# Patient Record
Sex: MALE | Race: WHITE | NOT HISPANIC OR LATINO | Employment: OTHER | URBAN - METROPOLITAN AREA
[De-identification: names, ages, dates, MRNs, and addresses within clinical notes are randomized per-mention and may not be internally consistent; named-entity substitution may affect disease eponyms.]

---

## 2017-01-02 DIAGNOSIS — C68.9 TRANSITIONAL CELL CARCINOMA: ICD-10-CM

## 2017-01-03 ENCOUNTER — OFFICE VISIT (OUTPATIENT)
Dept: HEMATOLOGY/ONCOLOGY | Facility: CLINIC | Age: 82
End: 2017-01-03
Payer: MEDICARE

## 2017-01-03 ENCOUNTER — INFUSION (OUTPATIENT)
Dept: INFUSION THERAPY | Facility: HOSPITAL | Age: 82
End: 2017-01-03
Attending: INTERNAL MEDICINE
Payer: MEDICARE

## 2017-01-03 VITALS
DIASTOLIC BLOOD PRESSURE: 60 MMHG | BODY MASS INDEX: 18.36 KG/M2 | TEMPERATURE: 98 F | BODY MASS INDEX: 18.36 KG/M2 | WEIGHT: 135.56 LBS | SYSTOLIC BLOOD PRESSURE: 100 MMHG | OXYGEN SATURATION: 100 % | HEART RATE: 78 BPM | WEIGHT: 135.56 LBS | HEIGHT: 72 IN | HEIGHT: 72 IN | RESPIRATION RATE: 18 BRPM

## 2017-01-03 DIAGNOSIS — N18.30 CHRONIC KIDNEY DISEASE, STAGE III (MODERATE): Primary | ICD-10-CM

## 2017-01-03 DIAGNOSIS — C64.2 TRANSITIONAL CELL CARCINOMA OF LEFT KIDNEY: ICD-10-CM

## 2017-01-03 DIAGNOSIS — C77.8 MALIGNANT NEOPLASM METASTATIC TO LYMPH NODES OF MULTIPLE SITES: ICD-10-CM

## 2017-01-03 DIAGNOSIS — C64.2 TRANSITIONAL CELL CARCINOMA OF LEFT KIDNEY: Primary | ICD-10-CM

## 2017-01-03 DIAGNOSIS — J84.10 PULMONARY FIBROSIS: ICD-10-CM

## 2017-01-03 DIAGNOSIS — Z51.11 CHEMOTHERAPY MANAGEMENT, ENCOUNTER FOR: ICD-10-CM

## 2017-01-03 DIAGNOSIS — N18.30 CHRONIC KIDNEY DISEASE, STAGE III (MODERATE): ICD-10-CM

## 2017-01-03 DIAGNOSIS — K21.9 GASTROESOPHAGEAL REFLUX DISEASE, ESOPHAGITIS PRESENCE NOT SPECIFIED: ICD-10-CM

## 2017-01-03 DIAGNOSIS — R05.9 COUGH: ICD-10-CM

## 2017-01-03 PROCEDURE — 1160F RVW MEDS BY RX/DR IN RCRD: CPT | Mod: S$GLB,,, | Performed by: INTERNAL MEDICINE

## 2017-01-03 PROCEDURE — 99499 UNLISTED E&M SERVICE: CPT | Mod: S$GLB,,, | Performed by: INTERNAL MEDICINE

## 2017-01-03 PROCEDURE — 1126F AMNT PAIN NOTED NONE PRSNT: CPT | Mod: S$GLB,,, | Performed by: INTERNAL MEDICINE

## 2017-01-03 PROCEDURE — 1159F MED LIST DOCD IN RCRD: CPT | Mod: S$GLB,,, | Performed by: INTERNAL MEDICINE

## 2017-01-03 PROCEDURE — 99999 PR PBB SHADOW E&M-EST. PATIENT-LVL IV: CPT | Mod: PBBFAC,,, | Performed by: INTERNAL MEDICINE

## 2017-01-03 PROCEDURE — 1157F ADVNC CARE PLAN IN RCRD: CPT | Mod: S$GLB,,, | Performed by: INTERNAL MEDICINE

## 2017-01-03 PROCEDURE — 99215 OFFICE O/P EST HI 40 MIN: CPT | Mod: 25,S$GLB,, | Performed by: INTERNAL MEDICINE

## 2017-01-03 PROCEDURE — 96413 CHEMO IV INFUSION 1 HR: CPT | Mod: PO

## 2017-01-03 PROCEDURE — 63600175 PHARM REV CODE 636 W HCPCS: Mod: PO | Performed by: INTERNAL MEDICINE

## 2017-01-03 PROCEDURE — 25000003 PHARM REV CODE 250: Mod: PO | Performed by: INTERNAL MEDICINE

## 2017-01-03 PROCEDURE — C9483 INJECTION, ATEZOLIZUMAB: HCPCS | Mod: PO | Performed by: INTERNAL MEDICINE

## 2017-01-03 RX ORDER — GUAIFENESIN 600 MG/1
600 TABLET, EXTENDED RELEASE ORAL 2 TIMES DAILY
Qty: 30 TABLET | Refills: 0 | Status: SHIPPED | OUTPATIENT
Start: 2017-01-03 | End: 2018-01-03

## 2017-01-03 RX ORDER — HEPARIN 100 UNIT/ML
500 SYRINGE INTRAVENOUS
Status: COMPLETED | OUTPATIENT
Start: 2017-01-03 | End: 2017-01-03

## 2017-01-03 RX ORDER — PREDNISONE 10 MG/1
TABLET ORAL
Qty: 30 TABLET | Refills: 0 | Status: SHIPPED | OUTPATIENT
Start: 2017-01-03 | End: 2017-01-24

## 2017-01-03 RX ORDER — SODIUM CHLORIDE 0.9 % (FLUSH) 0.9 %
10 SYRINGE (ML) INJECTION
Status: CANCELLED
Start: 2017-01-03

## 2017-01-03 RX ORDER — ESOMEPRAZOLE MAGNESIUM 40 MG/1
CAPSULE, DELAYED RELEASE ORAL
Qty: 30 CAPSULE | Refills: 0 | Status: SHIPPED | OUTPATIENT
Start: 2017-01-03 | End: 2017-01-31 | Stop reason: SDUPTHER

## 2017-01-03 RX ORDER — HEPARIN 100 UNIT/ML
500 SYRINGE INTRAVENOUS
Status: CANCELLED
Start: 2017-01-03 | End: 2017-01-03

## 2017-01-03 RX ADMIN — HEPARIN 500 UNITS: 100 SYRINGE at 02:01

## 2017-01-03 RX ADMIN — ATEZOLIZUMAB 1200 MG: 1200 INJECTION, SOLUTION INTRAVENOUS at 02:01

## 2017-01-03 RX ADMIN — SODIUM CHLORIDE: 9 INJECTION, SOLUTION INTRAVENOUS at 02:01

## 2017-01-03 NOTE — PLAN OF CARE
Problem: Patient Care Overview (Adult)  Goal: Plan of Care Review  Outcome: Ongoing (interventions implemented as appropriate)  Pt is without complaint today

## 2017-01-03 NOTE — PROGRESS NOTES
Hematology/Oncology Established Visit    Reason for Consult: Management of renal mass    Consult requested by: Dr. 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  Note: Dr. Hollie Acevedo in Pathology stated that urothelial type favored rather than RCC given positivity for MURRAY-3 and p63 and negativity for PAX-8.    Prior Treatment: Weekly Carboplatin-Gemcitabine, starting 3/17/16 (given on days 1, 8, 15 of 28-day cycle). Discontinued 9/7/16 due to disease progression.    Current Treatment: Atezolizumab 1200mg IV every 3 weeks    History of Present Illness:  Mr. Lake is an 89 y/o male with HTN, CAD, h/o MALT lymphoma s/p radiation who comes in for evaluation of metastatic renal cell carcinoma. He was initially evaluated by Dr. Bonds in [facility] at the [facility]. Given drenching night sweats and weight loss, patient underwent PET scan, which was notable for a large hypermetabolic left renal mass as well as hypermetabolic retroperitoneal LNs and adrenal gland on right. Potential treatment with nephrectomy followed by systemic therapy was discussed with the patient as well as hospice. Patient opted to think about this for a while and obtain a 2nd opinion. He saw. Dr. Aiden Hendrickson in [place] for the 2nd opinion. He underwent a  CT-guided biopsy of the kidney mass, was told it was positive for renal cell carcinoma and not transitional cell carcinoma. He was told he could start on potential oral agents for treatment. The patient and his wife were in the process of moving into an assisted living facility. Then they both were admitted for pneumonia. The patient was discharged from the hospital in early February, has been admitted to [facility] living [facility], and now comes in to discuss potentially starting some sort of treatment. Prior to the recent PNA, patient states he was active  as the sole caretaker of his wife with Alzheimer's dementia. He did all the grocery shopping and cooking. He was up on his feet more than 50% of the day. He has slowed down after the recent PNA. However, he has a good appetite and states he is gaining strength. He does endorse fatigue, BROWNING, but no CP. Outside records reviewed: Urine cytology negative. Cystoscopy with biopsy negative per patient. CT-guided biopsy consistent with transitional cell carcinoma.    Patient comes in for follow up and for continued treatment on Atezolizumab (Tecentriq). He does have a mildly productive cough. No recent fevers, chills, sweats. His appetite has improved and he has gained some weight.  His chronic medical problems include BPH which is controlled on Flomax and Pulmonary fibrosis which is stable with use of Albuterol inhaler prn. He is taking stool softeners as needed for constipation. His chronic medical problems include HTN, CAD, managed by primary care.    ROS:  General:  No wt loss, fever/chills, night sweats +fatigue  Eyes: No vision problems, pain or inflammation.   Ears/Nose: No difficultly hearing, ear pain, rhinorrhea, or epistaxis  Oropharynx: No ulcers, dysphagia, or odynophagia  Cardiovascular: No chest pains, sob, PND. +dyspnea on exertion  Pulmonary: No cough, sob, hemoptysis  Gastrointestional: No n/v/d, melena, hematochezia, or change in bowel habits +poor appetite  : No dysuria, hematuria, pelvic pain or flank pain  Musculoskeletal: No myalgias, weakness, or arthralgias  Neurological: No headaches, focal deficits, or seizure activity  Endocrine: No heat or cold intolerance   Skin: No rashes or lesions +pruritus  Psychiatric: No symptoms of mood disorders  Heme/Lymph: No lymph node enlargment    Past Medical History   Diagnosis Date    Anemia     AR (allergic rhinitis)     Arthritis     Atrial fibrillation      in 2010 after pneumonia    BPH (benign prostatic hyperplasia)     BPH with urinary obstruction      CAD (coronary artery disease)     Cancer      MALT lymphoma//radiation    Cancer of lymphatic and hematopoietic tissue 2/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 Former smoker, quit in 1970 after 30 pk-yrs. 10 glasses of wine/week or 1-2 glasses per day. Retired from prior work in engineering. Lives in an assisted living facility now.    Family History: family history includes Cancer in his sister; Heart disease in his brother and mother; Stroke in his brother. There is no history of Amblyopia, Blindness, Diabetes, Glaucoma, Macular degeneration, Retinal detachment, Strabismus, Thyroid disease, Hypertension, or Cataracts. Sister had DLBCL.    Physical Exam:  There were no vitals filed for this visit.  There is no height or weight on file to calculate BMI.   ECOG PS: 2  General:  AAOx4, pleasant thin elderly male in no acute distress, accompanied by his niece.   HEENT: EOMI. Normocephalic and atraumatic. No maxillary sinus tenderness. External auditory canals clear and TMs intact without lesions. Nasal and oral mucosal membranes moist. Normal dentition and gums.   Neck: no LAD, thyromegaly, normal ROM  Pulmonary: Fine crackles in bilateral lower lung fields, otherwise clear to auscultation, Normal  effort with no accessory muscle use, no wheezes/rales/rhonchi  CV: Normal rate, regular rhythm, no murmurs/rubs/gallops, no edema  ABD:  Soft, nontender, nondistended, no mass, and without hepatosplenomegaly   Ext: No clubbing, cyanosis, normal ROM. 2+ pitting edema in BLE  Skin: No rashes, lesions, bruising or petechiae  Neurological: No focal deficits, CN II to XII grossly intact, normal coordination    Psychiatric:  Normal mood, affect and judgement  Hem/Lymph:  No submandibular, cervical, supraclavicular, axillary LAD.    Labs:    Lab Results   Component Value Date    WBC 7.60 01/03/2017    HGB 10.6 (L) 01/03/2017    HCT 34.5 (L) 01/03/2017    MCV 92 01/03/2017     01/03/2017     Lab Results   Component Value Date     01/03/2017    K 4.9 01/03/2017     01/03/2017    CO2 23 01/03/2017    BUN 36 (H) 01/03/2017    CREATININE 1.2 01/03/2017    CALCIUM 9.5 01/03/2017    ANIONGAP 11 01/03/2017    ESTGFRAFRICA >60 01/03/2017    EGFRNONAA 53 (A) 01/03/2017     Lab Results   Component Value Date    ALT 13 01/03/2017    AST 15 01/03/2017    ALKPHOS 59 01/03/2017    BILITOT 0.2 01/03/2017       Lab Results   Component Value Date    IRON 16 (L) 02/25/2016    TIBC 271 02/25/2016    FERRITIN 1435 (H) 04/21/2016     Lab Results   Component Value Date    ZCYBTAUN27 594 07/14/2016     Lab Results   Component Value Date    FOLATE 15.6 07/14/2016     Lab Results   Component Value Date    TSH 0.431 12/13/2016       Imaging:  PET/CT 10/6/2015:   Large hypermetabolic left renal mass highly suspicious for renal cell carcinoma. There also findings suggestive of metastatic disease to mediastinal lymph nodes, a right para-aortic retroperitoneal lymph node, and the right adrenal gland.    CXR 2/16/16:  Interval improved inspiratory result. Overall appearance appears to have returned to baseline. Chronic interstitial disease with focal areas of fibrosis again noted, more pronounced on the RIGHT. No dense consolidation or  definite effusion. Osteopenia and spondylosis. Narrowing at the cardia mediastinal contour and normal appearance of the hilar soft tissues. Trachea is midline.     Chest CT and urogram 3/9/16:  1. Diffusely abnormal appearance of the left kidney which measures approximately 13.6 x 9 cm in diameter and appears nonfunctioning. Most of the kidney appears to be occupied by a neoplastic process.  2. Complex cyst upper pole right kidney containing mural calcification.  3. Arteriosclerotic disease.  4. Old granulomatous disease.  5. Interstitial disease in both lungs with calcified pleural plaques favoring asbestos exposure.  6. Groundglass nodules in the right lung measuring up to 8.2 mm. Per Fleischer Society guidelines for nodule >8mm; in a low or high risk patient, recommend 3, 9, and 24 month CT chest follow-up to exclude neoplasia. PET scan and/or biopsy may also be considered. Metastatic disease not excluded.  7. Retroperitoneal adenopathy.   8. Enlargement of the right adrenal gland. Metastatic disease not excluded.    Chest/abd/pelvis CT 5/5/16:  1.  Left renal mass appears overall stable in size.  2.  Stable appearance to mildly enlarged right adrenal gland.  3.  Interval decrease in size of index right periaortic node.  4.  Other chronic findings as discussed above appear overall stable.    Chest/abd/pelvis CT 97/16:  Detrimental interval change with regard to enlarging right hilar and retroperitoneal lymphadenopathy.  Little interval change in large left renal mass.    Chest/abd/pelvis CT 11/9/16:  1.  Slight interval worsening with enlarging confluent right hilar adenopathy and minimal increase in size of primary left renal lesion.  2.  Unchanged retroperitoneal adenopathy..  3.  Other stable chronic findings as above.    Assessment / Plan:  Leeroy Lake is a 89 y.o. male with HTN, CAD, prior h/o MALT lymphoma comes in for evaluation of metastatic renal cell carcinoma.    1. Metastatic transitional cell  carcinoma: Although history provided by patient was suggestive of metastatic renal cell carcinoma, review of pathology report and discussion with pathologist who read the biopsy is more consistent with transitional cell (urothelial carcinoma), likely arising from the left renal pelvis. Given metastatic lympadenopathy on PET, specifically the left periaortic LN of 2.5 x 2.2cm with SUV 16.9, feel this is clearly metastatic disease. There is no indication for nephrectomy in the setting of metastatic transitional cell carcinoma. Pt was treated with weekly Carboplatin + Gemcitabine. Then, due to disease progression, he was switched to Atezolizumab.   -- Discontinued Carbo[place] given disease progression.   -- Proceed with 2nd line Atezolizumab - cycle 6, day 1 today. This will be given 1200mg IV every 3 weeks.  -- Chest/abd/pelvis CT in 2 weeks.   -- Return in 3 weeks for cycle 7    2. H/o hyperkalemia: Better controlled now that pt is limiting juices and following a low potassium diet.    3.  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 Fluctuating and likely due to anemia of chronic disease, cancer and chemotherapy. S/p Injectafer x 2. VitB12, folate normal.  -- No Procrit needed today given Hgb 10.6    4. Pulmonary fibrosis: Stable. Seen on recent CXR. Unclear etiology and may be related to prior work exposure or may be idiopathic. Uses Albuterol inhaler prn.    5. Weight loss/low appetite: Improved with continued treatment and Prednisone.   -- Change Prednisone to every other day 10mg    6. Pruritus: 13% incidence of this seen with Atezolizumab. Improved with Benadryl ointment.     Ankita Edge M.D.  Hematology Oncology

## 2017-01-03 NOTE — MR AVS SNAPSHOT
Patient Information     Patient Name Sex Leeroy Lopez Male 4/10/1927      Visit Information        Provider Department Dept Phone Center    1/3/2017 1:30 PM [facility] Chemo Infusion [facility] Chemotherapy Infusion [phone] [facility]      Patient Instructions     None      Your Current Medications Are     acetaminophen (TYLENOL) 650 MG TbSR    carvedilol (COREG) 3.125 MG tablet    dutasteride (AVODART) 0.5 mg capsule    esomeprazole (NEXIUM) 40 MG capsule    Lactobacillus rhamnosus GG (CULTURELLE) 10 billion cell capsule    predniSONE (DELTASONE) 10 MG tablet    senna (SENOKOT) 8.6 mg tablet    tamsulosin (FLOMAX) 0.4 mg Cp24    guaifenesin (MUCINEX) 600 mg 12 hr tablet    nitroGLYCERIN (NITROSTAT) 0.3 MG SL tablet    ondansetron (ZOFRAN) 8 MG tablet    diphenhydrAMINE-zinc acetate 1-0.1% (BENADRYL ITCH STOPPING) cream (Discontinued)      Facility-Administered Medications     atezolizumab (TECENTRIQ) 1,200 mg in sodium chloride 0.9% 270 mL chemo infusion    heparin, porcine (PF) 100 unit/mL injection flush 500 Units    sodium chloride 0.9% 100 mL flush bag      Appointments for Next Year     2017 10:30 AM HEARING AID FOLLOW UP (30 min.) [facility] - Hearing Aids HEARING AIDSIFEANYI    Arrive at check-in approximately 15 minutes before your scheduled appointment time. Bring all outside medical records and imaging, along with a list of your current medications and insurance card.    (off [employer]) 4th floor    2017  1:15 PM NON FASTING LAB (5 min.) [facility] LABORATORYGIOVANI    Arrive at check-in approximately 15 minutes before your scheduled appointment time. Bring all outside medical records and imaging, along with a list of your current medications and insurance card.    (off [employer]) 2nd floor    2017  1:40 PM ESTABLISHED PATIENT (20 min.) [facility] - Hemotology Oncology Ankita Edge MD    Arrive at check-in approximately 15 minutes before your scheduled  appointment time. Bring all outside medical records and imaging, along with a list of your current medications and insurance card.    (off [address]) 3rd Floor    1/24/2017  2:15 PM INFUSION 060 MIN (60 min.) [facility] CHAIR [facility]    Arrive at check-in approximately 15 minutes before your scheduled appointment time. Bring all outside medical records and imaging, along with a list of your current medications and insurance card.    2/2/2017 10:25 AM NON FASTING LAB (5 min.) [facility] LABORATORY [facility]    Arrive at check-in approximately 15 minutes before your scheduled appointment time. Bring all outside medical records and imaging, along with a list of your current medications and insurance card.    (off [address]) 2nd floor    2/2/2017 11:20 AM ESTABLISHED PATIENT (20 min.) [facility] - Nephrology Rafael Sultana MD    Arrive at check-in approximately 15 minutes before your scheduled appointment time. Bring all outside medical records and imaging, along with a list of your current medications and insurance card.    (off [address]) 3t floor    2/14/2017 11:40 AM ESTABLISHED PATIENT (20 min.) [facility] - Cardiology Donnie Raygoza MD    Arrive at check-in approximately 15 minutes before your scheduled appointment time. Bring all outside medical records and imaging, along with a list of your current medications and insurance card.    (off [address]) 3rd floor    4/24/2017 10:20 AM ESTABLISHED PATIENT (20 min.) [facility] Pulmonary Services Ace Kelly MD    Arrive at check-in approximately 15 minutes before your scheduled appointment time. Bring all outside medical records and imaging, along with a list of your current medications and insurance card.    (off [facility]) 2nd floor         Default Flowsheet Data (last 24 hours)      Amb Complex Vitals Ralph        01/03/17 1353 01/03/17 1318             Measurements    Weight 61.5 kg (135 lb 9.3 oz) 61.5 kg (135 lb 9.3  oz)       Height 6' (1.829 m) 6' (1.829 m)       BSA (Calculated - sq m) 1.77 sq meters 1.77 sq meters       BMI (Calculated) 18.4 18.4       BP  100/60       Temp  97.5 °F (36.4 °C)       Pulse  78       Resp  18       SpO2  100 %       Pain Assessment    Pain Score Zero Zero               Allergies     Codeine Swelling    Novocain  [Procaine]     Other reaction(s): Dizziness      Medications You Received from 01/02/2017 1445 to 01/03/2017 1445        Date/Time Order Dose Route Action     01/03/2017 1408 atezolizumab (TECENTRIQ) 1,200 mg in sodium chloride 0.9% 270 mL chemo infusion 1,200 mg Intravenous New Bag     01/03/2017 1405 sodium chloride 0.9% 100 mL flush bag   Intravenous New Bag      Current Discharge Medication List     Cannot display discharge medications since this is not an admission.

## 2017-01-05 ENCOUNTER — TELEPHONE (OUTPATIENT)
Dept: CARDIOLOGY | Facility: CLINIC | Age: 82
End: 2017-01-05

## 2017-01-05 DIAGNOSIS — E78.2 MIXED HYPERLIPIDEMIA: Primary | ICD-10-CM

## 2017-01-05 RX ORDER — ATORVASTATIN CALCIUM 20 MG/1
20 TABLET, FILM COATED ORAL DAILY
Qty: 90 TABLET | Refills: 3 | Status: SHIPPED | OUTPATIENT
Start: 2017-01-05 | End: 2018-01-05

## 2017-01-19 ENCOUNTER — HOSPITAL ENCOUNTER (OUTPATIENT)
Dept: RADIOLOGY | Facility: HOSPITAL | Age: 82
Discharge: HOME | End: 2017-01-19
Attending: INTERNAL MEDICINE
Payer: MEDICARE

## 2017-01-19 ENCOUNTER — HOSPITAL ENCOUNTER (OUTPATIENT)
Dept: RADIOLOGY | Facility: HOSPITAL | Age: 82
Discharge: HOME | End: 2017-01-19
Attending: FAMILY MEDICINE
Payer: MEDICARE

## 2017-01-19 ENCOUNTER — OFFICE VISIT (OUTPATIENT)
Dept: URGENT CARE | Facility: CLINIC | Age: 82
End: 2017-01-19
Payer: MEDICARE

## 2017-01-19 VITALS — DIASTOLIC BLOOD PRESSURE: 68 MMHG | SYSTOLIC BLOOD PRESSURE: 130 MMHG | OXYGEN SATURATION: 97 % | TEMPERATURE: 98 F
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
## 2017-01-19 DIAGNOSIS — R42 DIZZINESS: ICD-10-CM

## 2017-01-19 DIAGNOSIS — C64.2 TRANSITIONAL CELL CARCINOMA OF LEFT KIDNEY: ICD-10-CM

## 2017-01-19 DIAGNOSIS — C64.2 METASTATIC RENAL CELL CARCINOMA, LEFT: ICD-10-CM

## 2017-01-19 DIAGNOSIS — I95.1 ORTHOSTATIC HYPOTENSION: ICD-10-CM

## 2017-01-19 PROBLEM — C64.9 METASTATIC RENAL CELL CARCINOMA: Status: ACTIVE | Noted: 2017-01-19

## 2017-01-19 LAB — GLUCOSE SERPL-MCNC: 143 MG/DL (ref 70–110)

## 2017-01-19 PROCEDURE — 71260 CT THORAX DX C+: CPT | Mod: 26,,, | Performed by: RADIOLOGY

## 2017-01-19 PROCEDURE — 1160F RVW MEDS BY RX/DR IN RCRD: CPT | Mod: S$GLB,,, | Performed by: PHYSICIAN ASSISTANT

## 2017-01-19 PROCEDURE — 99213 OFFICE O/P EST LOW 20 MIN: CPT | Mod: 25,S$GLB,, | Performed by: PHYSICIAN ASSISTANT

## 2017-01-19 PROCEDURE — 70450 CT HEAD/BRAIN W/O DYE: CPT | Mod: 26,,, | Performed by: RADIOLOGY

## 2017-01-19 PROCEDURE — 99999 PR PBB SHADOW E&M-EST. PATIENT-LVL III: CPT | Mod: PBBFAC,,, | Performed by: PHYSICIAN ASSISTANT

## 2017-01-19 PROCEDURE — 1159F MED LIST DOCD IN RCRD: CPT | Mod: S$GLB,,, | Performed by: PHYSICIAN ASSISTANT

## 2017-01-19 PROCEDURE — 74178 CT ABD&PLV WO CNTR FLWD CNTR: CPT | Mod: 26,,, | Performed by: RADIOLOGY

## 2017-01-19 PROCEDURE — 82948 REAGENT STRIP/BLOOD GLUCOSE: CPT | Mod: S$GLB,,, | Performed by: PHYSICIAN ASSISTANT

## 2017-01-19 PROCEDURE — 1157F ADVNC CARE PLAN IN RCRD: CPT | Mod: S$GLB,,, | Performed by: PHYSICIAN ASSISTANT

## 2017-01-19 RX ADMIN — IOHEXOL 75 ML: 350 INJECTION, SOLUTION INTRAVENOUS at 01:01

## 2017-01-19 RX ADMIN — IOHEXOL 30 ML: 350 INJECTION, SOLUTION INTRAVENOUS at 11:01

## 2017-01-19 NOTE — PATIENT INSTRUCTIONS
Orthostatic Low Blood Pressure (Hypotension)  A blood pressure reading is made up of 2 numbers There is a top number over a bottom number. The top number is the systolic pressure. The bottom number is the diastolic pressure. A normal blood pressure is less than 120 over less than 80. Low blood pressure (hypotension) is a blood pressure that is less than what is normal for you.  Orthostatic hypotension is a type of low blood pressure that occurs only when you change position from lying to standing. It can cause dizziness, lightheadedness, or fainting.  Medicines can cause orthostatic hypotension. These include:  · High blood pressure medicines  · Water pills (diuretics)  · Some heart medicines  · Some antidepressants  · Pain, anxiety, sedative, and sleeping medicines  Other causes include:  · Dehydration from vomiting, diarrhea, or not getting enough fluids  · Severe infection  · High fever  · Blood loss. This could be bleeding from the stomach or intestines.  Treatment will depend on what is causing your low blood pressure.  Home care  Follow these guidelines when caring for yourself at home:  · Rest until your symptoms get better.  · Change positions slowly from lying to standing. When getting out of bed, sit on the side of the bed with your legs down for at least 30 seconds before standing. This gives your body time to adjust to the position change.  · Follow the treatment plan described by your health care provider.  Follow-up care  Follow up with your health care provider, or as advised.  When to seek medical advice  Call your health care provider right away if any of these occur:  · Dizziness, lightheadedness, or fainting  · Black or red color in your stools or vomit  · Diarrhea or vomiting that doesnt go away  · You arent able to eat or drink  · Fever of 100.4°F (38°C) or higher, or as directed by your health care provider  · Burning when you urinate  · Foul-smelling urine  © 4088-4514 [employer]. [address] [place] [address], [place] [address]. All rights reserved. This information is not intended as a substitute for professional medical care. Always follow your healthcare professional's instructions.        Head Injury (Adult)    You have a head injury. It does not appear serious at this time. But symptoms of a more serious problem, such as a mild brain injury (concussion) or bruising or bleeding in the brain, may appear later. For this reason, you or someone caring for you will need to watch for the symptoms listed below. Once youre home, also be sure to follow any care instructions youre given.  Home care  Watch for the following symptoms  Seek emergency medical care if you have any of these symptoms over the next hours to days:   · Headache  · Nausea or vomiting  · Dizziness  · Sensitivity to light or noise  · Unusual sleepiness or grogginess  · Trouble falling asleep  · Personality changes  · Vision changes  · Memory loss  · Confusion  · Trouble walking or clumsiness  · Loss of consciousness (even for a short time)  · Inability to be awakened  · Stiff neck  · Weakness or numbness in any part of the body  · Seizures  General care  · If you were prescribed medicines for pain, use them as directed. Note: Dont take other medicines for pain without talking to your provider first.  · To help reduce swelling and pain, apply a cold source to the injured area for up to 20 minutes at a time. Do this as often as directed. Use a cold pack or bag of ice wrapped in a thin towel. Never apply a cold source directly to the skin.  · If you have cuts or scrapes as a result of your head injury, care for them as directed.  · For the next 24 hours (or longer, if instructed):  ¨ Dont drink alcohol or use sedatives or other medicines that make you sleepy.  ¨ Dont drive or operate machinery.  ¨ Dont do anything strenuous, such as heavy lifting or straining.  ¨ Limit tasks that require concentration. This includes  reading, using a smartphone or computer, watching TV, and playing video games.  ¨ Dont return to sports or other activities that could result in another head injury.  Follow-up care  Follow up with your healthcare provider, or as directed. If imaging tests were done, they will be reviewed by a doctor. You will be told the results and any new findings that may affect your care.  When to seek medical advice  Call your healthcare provider right away if any of these occur:  · Pain doesnt get better or worsens  · New or increased swelling or bruising  · Fever of 100.4°F (38°C) or higher, or as directed by your provider  · Increased redness, warmth, drainage, or bleeding from the injured area  · Fluid drainage or bleeding from the nose or ears  · Any depression or bony abnormality in the injured area  © 3548-4648 [employer]. [address] [place] [address], [place] [address]. All rights reserved. This information is not intended as a substitute for professional medical care. Always follow your healthcare professional's instructions.

## 2017-01-19 NOTE — MR AVS SNAPSHOT
[facility]  [address] [facility] [place] [address]  Phone: [phone]  Fax: [phone]                  Leeroy Lake   2017 1:50 PM   Office Visit    Description:  Male : 4/10/1927   Provider:  Yuli Whitaker PA-C   Department:  [facility]           Reason for Visit     Fall           Diagnoses this Visit        Comments    Fall, initial encounter    -  Primary     Dizziness         Orthostatic hypotension                To Do List           Future Appointments        Provider Department Dept Phone    2017 1:15 PM LABORATORY, [facility] [phone]    2017 1:40 PM Ankita Edge MD [facility] Hemotology Oncology [phone]    2017 2:15 PM CHAIR 06 [facility] [phone]    2017 4:00 PM Aries Payton MD [facility] Internal Medicine [phone]    2017 10:25 AM LABORATORY, [facility] [phone]      Goals (5 Years of Data)     None      Follow-Up and Disposition     Return if symptoms worsen or fail to improve.      [facility]     [facility] -  Assistance  Registered nurses in the [facility] provide clinical advisement, health education, appointment booking, and other advisory services.  Call for this free service at [phone].             Medications           Message regarding Medications     Verify the changes and/or additions to your medication regime listed below are the same as discussed with your clinician today.  If any of these changes or additions are incorrect, please notify your healthcare provider.             Verify that the below list of medications is an accurate representation of the medications you are currently taking.  If none reported, the list may be blank. If incorrect, please contact your healthcare provider. Carry this list with you in case of emergency.           Current Medications      acetaminophen (TYLENOL) 650 MG TbSR Take 650 mg by mouth every 8 (eight) hours as needed.     atorvastatin (LIPITOR) 20 MG tablet Take 1 tablet (20 mg total) by mouth once daily.    carvedilol (COREG) 3.125 MG tablet TAKE ONE TABLET BY MOUTH TWICE DAILY    dutasteride (AVODART) 0.5 mg capsule TAKE 1 CAPSULE(0.5 MG) BY MOUTH EVERY DAY    esomeprazole (NEXIUM) 40 MG capsule TAKE ONE CAPSULE BY MOUTH DAILY    guaifenesin (MUCINEX) 600 mg 12 hr tablet Take 1 tablet (600 mg total) by mouth 2 (two) times daily.    Lactobacillus rhamnosus GG (CULTURELLE) 10 billion cell capsule Take 1 capsule by mouth once daily.    nitroGLYCERIN (NITROSTAT) 0.3 MG SL tablet As directed PRN    ondansetron (ZOFRAN) 8 MG tablet Take by mouth every 12 (twelve) hours as needed for Nausea.    predniSONE (DELTASONE) 10 MG tablet TAKE 1 TABLET BY MOUTH DAILY WITH FOOD    senna (SENOKOT) 8.6 mg tablet Take 1 tablet by mouth once daily.    tamsulosin (FLOMAX) 0.4 mg Cp24 Take 1 capsule (0.4 mg total) by mouth once daily. NEEDS OFFICE VISIT OR ESTABLISH CARE IN [place]           Clinical Reference Information           Vital Signs - Last Recorded  Most recent update: 1/19/2017  1:44 PM by Erika Hendricks MA    BP Temp SpO2             130/68 (BP Location: Right arm, Patient Position: Sitting, BP Method: Manual) 97.7 °F (36.5 °C) (Oral) 97%         Blood Pressure          Most Recent Value    BP  130/68      Allergies as of 1/19/2017     Codeine    Novocain  [Procaine]      Immunizations Administered on Date of Encounter - 1/19/2017     None      Orders Placed During Today's Visit      Normal Orders This Visit    Orthostatic blood pressure     POCT glucose     Future Labs/Procedures Expected by Expires    CBC auto differential  1/19/2017 3/20/2018    Comprehensive metabolic panel  1/19/2017 3/20/2018    CT Head Without Contrast  1/19/2017 1/19/2018 1/19/2017  2:35 PM - Bianca Thayer LPN      Component Results     Component Value Flag  Ref Range Units Status    POC Glucose 143 (A) 70 - 110 mg/dL Final            Instructions      Orthostatic Low Blood Pressure (Hypotension)  A blood pressure reading is made up of 2 numbers There is a top number over a bottom number. The top number is the systolic pressure. The bottom number is the diastolic pressure. A normal blood pressure is less than 120 over less than 80. Low blood pressure (hypotension) is a blood pressure that is less than what is normal for you.  Orthostatic hypotension is a type of low blood pressure that occurs only when you change position from lying to standing. It can cause dizziness, lightheadedness, or fainting.  Medicines can cause orthostatic hypotension. These include:  · High blood pressure medicines  · Water pills (diuretics)  · Some heart medicines  · Some antidepressants  · Pain, anxiety, sedative, and sleeping medicines  Other causes include:  · Dehydration from vomiting, diarrhea, or not getting enough fluids  · Severe infection  · High fever  · Blood loss. This could be bleeding from the stomach or intestines.  Treatment will depend on what is causing your low blood pressure.  Home care  Follow these guidelines when caring for yourself at home:  · Rest until your symptoms get better.  · Change positions slowly from lying to standing. When getting out of bed, sit on the side of the bed with your legs down for at least 30 seconds before standing. This gives your body time to adjust to the position change.  · Follow the treatment plan described by your health care provider.  Follow-up care  Follow up with your health care provider, or as advised.  When to seek medical advice  Call your health care provider right away if any of these occur:  · Dizziness, lightheadedness, or fainting  · Black or red color in your stools or vomit  · Diarrhea or vomiting that doesnt go away  · You arent able to eat or drink  · Fever of 100.4°F (38°C) or higher, or as directed by your health care  provider  · Burning when you urinate  · Foul-smelling urine  © 1257-3808 [employer] [address] [place] [address], [place] [address]. All rights reserved. This information is not intended as a substitute for professional medical care. Always follow your healthcare professional's instructions.        Head Injury (Adult)    You have a head injury. It does not appear serious at this time. But symptoms of a more serious problem, such as a mild brain injury (concussion) or bruising or bleeding in the brain, may appear later. For this reason, you or someone caring for you will need to watch for the symptoms listed below. Once youre home, also be sure to follow any care instructions youre given.  Home care  Watch for the following symptoms  Seek emergency medical care if you have any of these symptoms over the next hours to days:   · Headache  · Nausea or vomiting  · Dizziness  · Sensitivity to light or noise  · Unusual sleepiness or grogginess  · Trouble falling asleep  · Personality changes  · Vision changes  · Memory loss  · Confusion  · Trouble walking or clumsiness  · Loss of consciousness (even for a short time)  · Inability to be awakened  · Stiff neck  · Weakness or numbness in any part of the body  · Seizures  General care  · If you were prescribed medicines for pain, use them as directed. Note: Dont take other medicines for pain without talking to your provider first.  · To help reduce swelling and pain, apply a cold source to the injured area for up to 20 minutes at a time. Do this as often as directed. Use a cold pack or bag of ice wrapped in a thin towel. Never apply a cold source directly to the skin.  · If you have cuts or scrapes as a result of your head injury, care for them as directed.  · For the next 24 hours (or longer, if instructed):  ¨ Dont drink alcohol or use sedatives or other medicines that make you sleepy.  ¨ Dont drive or operate machinery.  ¨ Dont do anything strenuous, such as  heavy lifting or straining.  ¨ Limit tasks that require concentration. This includes reading, using a smartphone or computer, watching TV, and playing video games.  ¨ Dont return to sports or other activities that could result in another head injury.  Follow-up care  Follow up with your healthcare provider, or as directed. If imaging tests were done, they will be reviewed by a doctor. You will be told the results and any new findings that may affect your care.  When to seek medical advice  Call your healthcare provider right away if any of these occur:  · Pain doesnt get better or worsens  · New or increased swelling or bruising  · Fever of 100.4°F (38°C) or higher, or as directed by your provider  · Increased redness, warmth, drainage, or bleeding from the injured area  · Fluid drainage or bleeding from the nose or ears  · Any depression or bony abnormality in the injured area  © 5681-7313 [employer]. [address] [place] [address], [place] [address]. All rights reserved. This information is not intended as a substitute for professional medical care. Always follow your healthcare professional's instructions.

## 2017-01-19 NOTE — PROGRESS NOTES
"Subjective:       Patient ID: Lereoy Lake is a 89 y.o. male.    Chief Complaint: Fall (in lobby Rapid Response)    HPI Comments: Mr. Lake is an 88yo male that presents to Urgent Care following a fall in the clinic [facility].  Patient arrived at clinic today for imaging studies; states he felt dizzy after sitting up after imaging studies.  He tried to rest a minute before getting up.  As he went to leave, he fell in the lobby.  Patient states he normally walks with a walker.  Patient denies having lost consciousness or blacked out, says he "felt like I was going to fall and tried to fall forward but was unable to".  Patient denies hitting any other area aside from his head.  Says he feels like he may have scraped his L elbow.  Patient denies any other recent falls, he states, however, got on the floor a week or so ago to look for an object and couldn't get up but due to his knee pain not dizziness.  Patient states he has not eaten anything since breakfast this morning.  He denies any mental confusion and is able to state his name, the date, place and explain the incident that occurred.     Fall   The accident occurred less than 1 hour ago. Fall occurred: From standing. Distance fallen: from standing. The point of impact was the head and left elbow. The pain is present in the head ("head is a little achey"). The patient is experiencing no pain (mild aching towards the back of his head). Associated symptoms include headaches. Pertinent negatives include no abdominal pain, nausea, numbness or vomiting.     Review of Systems   Constitutional: Negative for chills and diaphoresis.   Eyes: Negative for visual disturbance (-) blurred or double vision.   Respiratory: Positive for shortness of breath (mild, common for patient). Negative for chest tightness and wheezing.    Cardiovascular: Negative for chest pain and palpitations.   Gastrointestinal: Negative for abdominal pain, diarrhea, nausea and vomiting. " "  Musculoskeletal: Negative for back pain and neck pain.   Skin: Positive for wound (Head wound, L elbow abrasion).   Neurological: Positive for weakness and headaches. Negative for dizziness, speech difficulty, light-headedness and numbness.   Psychiatric/Behavioral: Negative for confusion.       Objective:      Physical Exam   Constitutional: He is oriented to person, place, and time.   HENT:   Head: Normocephalic. Head is with contusion and with laceration.       Right Ear: Tympanic membrane and ear canal normal.   Left Ear: Tympanic membrane and ear canal normal.   Nose: Nose normal.   Mouth/Throat: Uvula is midline and oropharynx is clear and moist.   Cardiovascular: Regular rhythm and normal heart sounds.    Pulmonary/Chest: Effort normal and breath sounds normal. No respiratory distress. He has no decreased breath sounds. He has no wheezes. He has no rhonchi. He has no rales.   Neurological: He is alert and oriented to person, place, and time. He has normal strength. He is not disoriented. No cranial nerve deficit or sensory deficit.   CN 3,4,6, 7, 9, 12 checked and intact   Skin: Skin is warm and dry. Abrasion noted. No bruising noted.        Psychiatric: He has a normal mood and affect. His speech is normal and behavior is normal. Thought content normal.       Assessment:       1. Fall, initial encounter    2. Dizziness    3. Orthostatic hypotension        Plan:   Fall, initial encounter  -     Orthostatic blood pressure  -     CT Head Without Contrast; Future; Expected date: 1/19/17  -     POCT glucose  -     CBC auto differential; Future; Expected date: 1/19/17  -     Comprehensive metabolic panel; Future; Expected date: 1/19/17    Dizziness  -     POCT glucose  -     CBC auto differential; Future; Expected date: 1/19/17  -     Comprehensive metabolic panel; Future; Expected date: 1/19/17    Orthostatic hypotension    Patient was given juice and crackers in clinic as he stated he was feeling "weak" since " he had not eaten since breakfast.    Called radiology and confirmed with doctor that read CT that there is not acute intracranial process; superficial hematoma is to the scalp area.  Discussed results with patient.    Lab work shows anemia (chronic for patient), he has routine lab work and is followed by hem/onc for renal cell carcinoma; repeat labs in place for next week.      Gave patient handout on orthostatic hypotension and head injury.  Printed and reviewed AVS.  Discussed methods to lessen/avoid orthostatic hypotension and explained in detail signs/symptoms following a head injury that would necessitate emergent care.    Head contusion cleaned and dressed.  Patient lives in assisted living and assures me that he has caregivers that check on him periodically throughout the day.      Patient set up with follow up appointment with PCP next Tuesday, informed patient of appointment.

## 2017-01-24 ENCOUNTER — OFFICE VISIT (OUTPATIENT)
Dept: HEMATOLOGY/ONCOLOGY | Facility: CLINIC | Age: 82
End: 2017-01-24
Payer: MEDICARE

## 2017-01-24 ENCOUNTER — HOSPITAL ENCOUNTER (OUTPATIENT)
Dept: RADIOLOGY | Facility: HOSPITAL | Age: 82
Discharge: HOME | End: 2017-01-24
Attending: INTERNAL MEDICINE
Payer: MEDICARE

## 2017-01-24 ENCOUNTER — OFFICE VISIT (OUTPATIENT)
Dept: INTERNAL MEDICINE | Facility: CLINIC | Age: 82
End: 2017-01-24
Payer: MEDICARE

## 2017-01-24 VITALS
HEART RATE: 103 BPM | SYSTOLIC BLOOD PRESSURE: 158 MMHG | OXYGEN SATURATION: 90 % | RESPIRATION RATE: 22 BRPM | WEIGHT: 136.69 LBS | DIASTOLIC BLOOD PRESSURE: 80 MMHG | HEIGHT: 72 IN | BODY MASS INDEX: 18.51 KG/M2

## 2017-01-24 VITALS
HEART RATE: 87 BPM | SYSTOLIC BLOOD PRESSURE: 140 MMHG | BODY MASS INDEX: 18.45 KG/M2 | DIASTOLIC BLOOD PRESSURE: 70 MMHG | TEMPERATURE: 99 F | OXYGEN SATURATION: 94 % | HEIGHT: 72 IN | WEIGHT: 136.25 LBS

## 2017-01-24 DIAGNOSIS — M25.522 LEFT ELBOW PAIN: ICD-10-CM

## 2017-01-24 DIAGNOSIS — R53.1 WEAKNESS: ICD-10-CM

## 2017-01-24 DIAGNOSIS — C64.2 TRANSITIONAL CELL CARCINOMA OF LEFT KIDNEY: ICD-10-CM

## 2017-01-24 DIAGNOSIS — C64.2 METASTATIC RENAL CELL CARCINOMA, LEFT: ICD-10-CM
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
## 2017-01-24 DIAGNOSIS — D63.8 ANEMIA OF OTHER CHRONIC DISEASE: ICD-10-CM

## 2017-01-24 DIAGNOSIS — C64.2 TRANSITIONAL CELL CARCINOMA OF LEFT KIDNEY: Primary | ICD-10-CM

## 2017-01-24 DIAGNOSIS — N40.0 BENIGN PROSTATIC HYPERPLASIA, PRESENCE OF LOWER URINARY TRACT SYMPTOMS UNSPECIFIED, UNSPECIFIED MORPHOLOGY: ICD-10-CM

## 2017-01-24 DIAGNOSIS — J18.9 PNEUMONIA OF RIGHT UPPER LOBE DUE TO INFECTIOUS ORGANISM: ICD-10-CM

## 2017-01-24 DIAGNOSIS — I49.9 IRREGULAR HEART RHYTHM: ICD-10-CM

## 2017-01-24 PROCEDURE — 99999 PR PBB SHADOW E&M-EST. PATIENT-LVL III: CPT | Mod: PBBFAC,,, | Performed by: INTERNAL MEDICINE

## 2017-01-24 PROCEDURE — 1157F ADVNC CARE PLAN IN RCRD: CPT | Mod: S$GLB,,, | Performed by: INTERNAL MEDICINE

## 2017-01-24 PROCEDURE — 99499 UNLISTED E&M SERVICE: CPT | Mod: S$GLB,,, | Performed by: INTERNAL MEDICINE

## 2017-01-24 PROCEDURE — 1160F RVW MEDS BY RX/DR IN RCRD: CPT | Mod: S$GLB,,, | Performed by: INTERNAL MEDICINE

## 2017-01-24 PROCEDURE — 99214 OFFICE O/P EST MOD 30 MIN: CPT | Mod: S$GLB,,, | Performed by: INTERNAL MEDICINE

## 2017-01-24 PROCEDURE — 1159F MED LIST DOCD IN RCRD: CPT | Mod: S$GLB,,, | Performed by: INTERNAL MEDICINE

## 2017-01-24 PROCEDURE — 1126F AMNT PAIN NOTED NONE PRSNT: CPT | Mod: S$GLB,,, | Performed by: INTERNAL MEDICINE

## 2017-01-24 PROCEDURE — 73080 X-RAY EXAM OF ELBOW: CPT | Mod: 26,LT,, | Performed by: RADIOLOGY

## 2017-01-24 PROCEDURE — 99999 PR PBB SHADOW E&M-EST. PATIENT-LVL IV: CPT | Mod: PBBFAC,,, | Performed by: INTERNAL MEDICINE

## 2017-01-24 RX ORDER — MOXIFLOXACIN HYDROCHLORIDE 400 MG/1
400 TABLET ORAL DAILY
Qty: 10 TABLET | Refills: 0 | Status: SHIPPED | OUTPATIENT
Start: 2017-01-24 | End: 2017-02-03

## 2017-01-24 RX ORDER — TAMSULOSIN HYDROCHLORIDE 0.4 MG/1
CAPSULE ORAL
Qty: 30 CAPSULE | Refills: 11 | Status: SHIPPED | OUTPATIENT
Start: 2017-01-24

## 2017-01-24 NOTE — PROGRESS NOTES
Hematology/Oncology Established Visit    Reason for Consult: Management of renal mass    Consult requested by: Dr. Payton, Primary care    [facility] Diagnosis: Transitional cell carcinoma    Stage: IV    Pathology:   Left kidney biopsy 12/2015 in [facility].  High grade (poorly differentiated) carcinoma, favor urothelial phenotype.  Note: Dr. Hollie Acevedo in Pathology stated that urothelial type favored rather than RCC given positivity for MURRAY-3 and p63 and negativity for PAX-8.    Prior Treatment: Weekly Carboplatin-Gemcitabine, starting 3/17/16 (given on days 1, 8, 15 of 28-day cycle). Discontinued 9/7/16 due to disease progression.    Current Treatment: Atezolizumab 1200mg IV every 3 weeks    History of Present Illness:  Mr. Lake is an 87 y/o male with HTN, CAD, h/o MALT lymphoma s/p radiation who comes in for evaluation of metastatic renal cell carcinoma. He was initially evaluated by Dr. Bonds in [facility] the [facility]. Given drenching night sweats and weight loss, patient underwent PET scan, which was notable for a large hypermetabolic left renal mass as well as hypermetabolic retroperitoneal LNs and adrenal gland on right. Potential treatment with nephrectomy followed by systemic therapy was discussed with the patient as well as hospice. Patient opted to think about this for a while and obtain a 2nd opinion. He saw. Dr. Aiden Hendrickson in [place] for the 2nd opinion. He underwent a  CT-guided biopsy of the kidney mass, was told it was positive for renal cell carcinoma and not transitional cell carcinoma. He was told he could start on potential oral agents for treatment. The patient and his wife were in the process of moving into an assisted living facility. Then they both were admitted for pneumonia. The patient was discharged from the hospital in early February, has been admitted to [facility] living [facility], and now comes in to discuss potentially starting some sort of treatment. 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 He did all the grocery shopping and cooking. He was up on his feet more than 50% of the day. He has slowed down after the recent PNA. However, he has a good appetite and states he is gaining strength. He does endorse fatigue, BROWNING, but no CP. Outside records reviewed: Urine cytology negative. Cystoscopy with biopsy negative per patient. CT-guided biopsy consistent with transitional cell carcinoma.    Patient comes in for follow up of transitional cell carcinoma. Unfortunately, patient became dizzy and suffered a fall last week after his CT scan. He thinks it was due to his fasting status. He did suffer a gash to his scalp with his slightly tender. He underwent head CT which was negative for any acute abnormalities. He also states his left elbow hurts ever since the fall. No recent fevers, chills, sweats. Although he has gained some weight, he states his appetite is going down again. He also feels chest congestion. His chronic medical problems include BPH which is controlled on Flomax and Pulmonary fibrosis which is stable with use of Albuterol inhaler prn. He is taking stool softeners as needed for constipation. His chronic medical problems include HTN, CAD, managed by primary care.    ROS:  General:  No wt loss, fever/chills, night sweats +fatigue  Eyes: No vision problems, pain or inflammation.   Ears/Nose: No difficultly hearing, ear pain, rhinorrhea, or epistaxis  Oropharynx: No ulcers, dysphagia, or odynophagia  Cardiovascular: No chest pains, sob, PND. +dyspnea on exertion  Pulmonary: No cough, sob, hemoptysis  Gastrointestional: No n/v/d, melena, hematochezia, or change in bowel habits +poor appetite  : No dysuria, hematuria, pelvic pain or flank pain  Musculoskeletal: No myalgias, weakness, or arthralgias  Neurological: No headaches, focal deficits, or seizure activity  Endocrine: No heat or cold  intolerance   Skin: No rashes or lesions +pruritus  Psychiatric: No symptoms of mood disorders  Heme/Lymph: No lymph node enlargment    Past Medical History   Diagnosis Date    Anemia     AR (allergic rhinitis)     Arthritis     Atrial fibrillation      in 2010 after pneumonia    BPH (benign prostatic hyperplasia)     BPH with urinary obstruction     CAD (coronary artery disease)     Cancer      MALT lymphoma//radiation    Cancer of lymphatic and hematopoietic tissue 2/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 Former smoker, quit in 1970 after 30 pk-yrs. 10 glasses of wine/week or 1-2 glasses per day. Retired from prior work in engineering. Lives in an assisted living facility now.    Family History: family history includes Cancer in his sister; Heart disease in his brother and mother; Stroke in his brother. There is no history of Amblyopia, Blindness, Diabetes, Glaucoma, Macular degeneration, Retinal detachment, Strabismus, Thyroid disease, Hypertension, or Cataracts. Sister had DLBCL.    Physical Exam:  Vitals:    01/24/17 1337   BP: (!) 158/80   Pulse: 103   Resp: (!) 22     Body mass index is 18.54 kg/(m^2).   ECOG PS: 2  General:  AAOx4, pleasant thin elderly male in no acute distress,  accompanied by his niece.   HEENT: EOMI. Normocephalic and atraumatic. No maxillary sinus tenderness. External auditory canals clear and TMs intact without lesions. Nasal and oral mucosal membranes moist. Normal dentition and gums.   Neck: no LAD, thyromegaly, normal ROM  Pulmonary: Fine crackles in bilateral lower lung fields, otherwise clear to auscultation, Normal effort with no accessory muscle use, no wheezes/rales/rhonchi  CV: Normal rate, regular rhythm, no murmurs/rubs/gallops, no edema  ABD:  Soft, nontender, nondistended, no mass, and without hepatosplenomegaly   Ext: No clubbing, cyanosis, normal ROM. 2+ pitting edema in BLE  Skin: No rashes, lesions, bruising or petechiae  Neurological: No focal deficits, CN II to XII grossly intact, normal coordination    Psychiatric:  Normal mood, affect and judgement  Hem/Lymph:  No submandibular, cervical, supraclavicular, axillary LAD.    Labs:    Lab Results   Component Value Date    WBC 9.80 01/24/2017    HGB 10.9 (L) 01/24/2017    HCT 34.9 (L) 01/24/2017    MCV 91 01/24/2017     (H) 01/24/2017     Lab Results   Component Value Date     01/24/2017    K 4.2 01/24/2017     01/24/2017    CO2 18 (L) 01/24/2017    BUN 33 (H) 01/24/2017    CREATININE 1.5 (H) 01/24/2017    CALCIUM 9.8 01/24/2017    ANIONGAP 15 01/24/2017    ESTGFRAFRICA 47 (A) 01/24/2017    EGFRNONAA 41 (A) 01/24/2017     Lab Results   Component Value Date    ALT 18 01/24/2017    AST 19 01/24/2017    ALKPHOS 77 01/24/2017    BILITOT 0.3 01/24/2017       Lab Results   Component Value Date    IRON 16 (L) 02/25/2016    TIBC 271 02/25/2016    FERRITIN 1435 (H) 04/21/2016     Lab Results   Component Value Date    MQZBQVBR59 594 07/14/2016     Lab Results   Component Value Date    FOLATE 15.6 07/14/2016     Lab Results   Component Value Date    TSH 0.431 12/13/2016       Imaging:  PET/CT 10/6/2015:   Large hypermetabolic left renal mass highly suspicious for renal cell carcinoma. There also  findings suggestive of metastatic disease to mediastinal lymph nodes, a right para-aortic retroperitoneal lymph node, and the right adrenal gland.    CXR 2/16/16:  Interval improved inspiratory result. Overall appearance appears to have returned to baseline. Chronic interstitial disease with focal areas of fibrosis again noted, more pronounced on the RIGHT. No dense consolidation or definite effusion. Osteopenia and spondylosis. Narrowing at the cardia mediastinal contour and normal appearance of the hilar soft tissues. Trachea is midline.     Chest CT and urogram 3/9/16:  1. Diffusely abnormal appearance of the left kidney which measures approximately 13.6 x 9 cm in diameter and appears nonfunctioning. Most of the kidney appears to be occupied by a neoplastic process.  2. Complex cyst upper pole right kidney containing mural calcification.  3. Arteriosclerotic disease.  4. Old granulomatous disease.  5. Interstitial disease in both lungs with calcified pleural plaques favoring asbestos exposure.  6. Groundglass nodules in the right lung measuring up to 8.2 mm. Per Fleischer Society guidelines for nodule >8mm; in a low or high risk patient, recommend 3, 9, and 24 month CT chest follow-up to exclude neoplasia. PET scan and/or biopsy may also be considered. Metastatic disease not excluded.  7. Retroperitoneal adenopathy.   8. Enlargement of the right adrenal gland. Metastatic disease not excluded.    Chest/abd/pelvis CT 5/5/16:  1.  Left renal mass appears overall stable in size.  2.  Stable appearance to mildly enlarged right adrenal gland.  3.  Interval decrease in size of index right periaortic node.  4.  Other chronic findings as discussed above appear overall stable.    Chest/abd/pelvis CT 97/16:  Detrimental interval change with regard to enlarging right hilar and retroperitoneal lymphadenopathy.  Little interval change in large left renal mass.    Chest/abd/pelvis CT 11/9/16:  1.  Slight interval worsening with  enlarging confluent right hilar adenopathy and minimal increase in size of primary left renal lesion.  2.  Unchanged retroperitoneal adenopathy..  3.  Other stable chronic findings as above.    Chest/abd/pelvis CT 1/19/17:  1.  Continued detrimental interval change with increase in size of large primary left renal mass and conglomerate right hilar lymph node mass.  2.  Newly developed right greater left pleural effusions and new infiltrate right upper lobe.  Clinically exclude pneumonia.  3.  Remainder as above.    Assessment / Plan:  Leeroy Lake is a 89 y.o. male with HTN, CAD, prior h/o MALT lymphoma comes in for evaluation of metastatic renal cell carcinoma.    1. Metastatic transitional cell carcinoma: Although history provided by patient was suggestive of metastatic renal cell carcinoma, review of pathology report and discussion with pathologist who read the biopsy is more consistent with transitional cell (urothelial carcinoma), likely arising from the left renal pelvis. Given metastatic lympadenopathy on PET, specifically the left periaortic LN of 2.5 x 2.2cm with SUV 16.9, feel this is clearly metastatic disease. There is no indication for nephrectomy in the setting of metastatic transitional cell carcinoma. Pt was treated with weekly Carboplatin + Gemcitabine. Then, due to disease progression, he was switched to Atezolizumab.   -- Discontinue Atezolizumab given disease progression.   -- Have requested insurance authorization for Nivolumab as it has activity against RCC and transitional cell carcinoma. If insurance denies coverage, we could consider single agent Pemetrexed (will need to start folic acid and VitB12).  -- CT-guided biopsy of left renal mass for repeat path and to send off of biotheranostics, which will drive future treatment.  -- Return in 2 weeks for MD follow up and treatment.    2. R upper lobe PNA: Seen on CT. Pt also complains of shortness of breath and chest congestion.  --  Moxifloxacin 400mg PO x 10 days + Mucinex.    3. Left elbow pain: Will check xray to rule out fracture given fall last week.     4.  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 Fluctuating and likely due to anemia of chronic disease, cancer and chemotherapy. S/p Injectafer x 2. VitB12, folate normal.  -- No Procrit needed today given Hgb 10.6    5. Pulmonary fibrosis: Stable. Seen on recent CXR. Unclear etiology and may be related to prior work exposure or may be idiopathic. Uses Albuterol inhaler prn.    6. Weight loss/low appetite: Discontinue Prednisone given use of PD-1 inhibitiors.       Ankita Edge M.D.  Hematology Oncology    Greater than 40 minutes spent discussing imaging results, progression of disease and treatment plan, diagnostic tests.

## 2017-01-24 NOTE — MR AVS SNAPSHOT
[facility]  [address] [facility] [address] [place] [place] [address]  Phone: [phone]  Fax: [phone]                  Leeroy Lake   2017 4:00 PM   Office Visit    Description:  Male : 4/10/1927   Provider:  Aries Payton MD   Department:  [facility] - Internal Medicine           Reason for Visit     Follow-up           Diagnoses this Visit        Comments    Fall, initial encounter    -  Primary     Pneumonia of right upper lobe due to infectious organism         Weakness         Irregular heart rhythm         Metastatic renal cell carcinoma, left         Transitional cell carcinoma of left kidney                To Do List           Future Appointments        Provider Department Dept Phone    2017 10:25 AM LAB[facility]ATORY, [facility] [phone]    2017 11:20 AM Rafael Sultana MD [facility] Nephrology [phone]    2017 12:40 PM LABORATORY, [facility] [phone]    2017 1:00 PM Ankita Edge MD [facility] Hemotology Oncology [phone]    2017 1:30 PM CHAIR 08 [facility] [phone]      Goals (5 Years of Data)     None      Follow-Up and Disposition     Return in about 4 months (around 2017), or if symptoms worsen or fail to improve.      [facility]     [facility] -  Assistance  Registered nurses in the [facility] provide clinical advisement, health education, appointment booking, and other advisory services.  Call for this free service at [phone].             Medications           Message regarding Medications     Verify the changes and/or additions to your medication regime listed below are the same as discussed with your clinician today.  If any of these changes or additions are incorrect, please notify your healthcare provider.             Verify that the below list of medications is an accurate representation of the medications you  are currently taking.  If none reported, the list may be blank. If incorrect, please contact your healthcare provider. Carry this list with you in case of emergency.           Current Medications     acetaminophen (TYLENOL) 650 MG TbSR Take 650 mg by mouth every 8 (eight) hours as needed.     atorvastatin (LIPITOR) 20 MG tablet Take 1 tablet (20 mg total) by mouth once daily.    carvedilol (COREG) 3.125 MG tablet TAKE ONE TABLET BY MOUTH TWICE DAILY    dutasteride (AVODART) 0.5 mg capsule TAKE 1 CAPSULE(0.5 MG) BY MOUTH EVERY DAY    esomeprazole (NEXIUM) 40 MG capsule TAKE ONE CAPSULE BY MOUTH DAILY    guaifenesin (MUCINEX) 600 mg 12 hr tablet Take 1 tablet (600 mg total) by mouth 2 (two) times daily.    Lactobacillus rhamnosus GG (CULTURELLE) 10 billion cell capsule Take 1 capsule by mouth once daily.    nitroGLYCERIN (NITROSTAT) 0.3 MG SL tablet As directed PRN    ondansetron (ZOFRAN) 8 MG tablet Take by mouth every 12 (twelve) hours as needed for Nausea.    senna (SENOKOT) 8.6 mg tablet Take 1 tablet by mouth once daily.    tamsulosin (FLOMAX) 0.4 mg Cp24 TAKE 1 CAPSULE(0.4 MG) BY MOUTH EVERY DAY    moxifloxacin (AVELOX) 400 mg tablet Take 1 tablet (400 mg total) by mouth once daily.           Clinical Reference Information           Vital Signs - Last Recorded  Most recent update: 1/24/2017  3:57 PM by Ginette Pittman MA    BP Pulse Temp Ht Wt SpO2    (!) 140/70 (BP Location: Right arm, Patient Position: Sitting) 87 98.6 °F (37 °C) (Tympanic) 6' (1.829 m) 61.8 kg (136 lb 3.9 oz) (!) 94%    BMI                18.48 kg/m2          Blood Pressure          Most Recent Value    BP  (!)  140/70      Allergies as of 1/24/2017     Codeine    Novocain  [Procaine]      Immunizations Administered on Date of Encounter - 1/24/2017     None      Orders Placed During Today's Visit      Normal Orders This Visit    Ambulatory referral to Home Health     Future Labs/Procedures Expected by Expires    EKG 12-lead  1/24/2017  1/24/2018

## 2017-01-24 NOTE — PROGRESS NOTES
"Subjective:      Patient ID: Leeroy Lake is a 89 y.o. male.    Chief Complaint: Follow-up (pt fell last week)    HPI Comments: 88 yo with Patient Active Problem List:     BPH with urinary obstruction     Hypertension     CAD (coronary artery disease)     AR (allergic rhinitis)     Gastroesophageal reflux disease     Macular drusen - Both Eyes     Post corneal transplant - Both Eyes     History of SCC (squamous cell carcinoma) of skin     Hypertensive heart disease with heart failure     Malnutrition of moderate degree     History of melanoma     Paroxysmal tachycardia     Coronary artery disease involving native coronary artery of native heart without angina pectoris     Nonrheumatic aortic valve insufficiency     Transitional cell carcinoma of left kidney     Iron deficiency anemia     Urothelial carcinoma     Dyspnea and respiratory abnormalities     Obstructive sleep apnea treated with BiPAP     Lung nodule     Interstitial lung disease     Chronic kidney disease, stage III (moderate)     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 Mr. Lake presented to Urgent Care last week following a fall in the clinic lobby.  he felt dizzy after sitting up after imaging studies.  He tried to rest a minute before getting up.  As he went to leave, he fell in the lobby.  Patient normally walks with a walker.  No loc.  says he "felt like I was going to fall and tried to fall forward but was unable to".  Patient denies hitting any other area aside from his head.  Patient denied any other recent falls, he states, however, got on the floor a week or so ago to look for an object and couldn't get up but due to his knee pain not dizziness.  Patient stated he did not eat anything since breakfast the day of his fall.  He " denied any mental confusion. No falls since this episode. Some soreness to scalp. He and his niece deny any MS changes or somnolence. Contusion to post head is improving. Reports cough x 6 weeks. Improved with mucinex but worsened again over past 2 weeks. occ productive for yellow sputum. Ct chest with poss rul. avelox rx today by heme onc. Pt state will start today.     Review of Systems   Constitutional: Positive for fatigue. Negative for chills and fever.   HENT: Negative for ear pain and sore throat.    Respiratory: Positive for cough and shortness of breath. Negative for wheezing.    Cardiovascular: Negative for chest pain and palpitations.   Gastrointestinal: Negative for abdominal pain and blood in stool.   Genitourinary: Negative for dysuria and hematuria.   Musculoskeletal: Negative for neck stiffness.   Skin: Negative for rash.   Neurological: Positive for weakness. Negative for dizziness, seizures, syncope, facial asymmetry, speech difficulty, numbness and headaches.     Objective:     Visit Vitals    BP (!) 140/70 (BP Location: Right arm, Patient Position: Sitting)    Pulse 87    Temp 98.6 °F (37 °C) (Tympanic)    Ht 6' (1.829 m)    Wt 61.8 kg (136 lb 3.9 oz)    SpO2 (!) 94%    BMI 18.48 kg/m2       Physical Exam   Constitutional: He is oriented to person, place, and time. He appears well-developed and well-nourished. No distress.   HENT:   Head: Normocephalic and atraumatic.       Mouth/Throat: Oropharynx is clear and moist.   Eyes: EOM are normal. Pupils are equal, round, and reactive to light.   Neck: Neck supple. No thyromegaly present.   Cardiovascular: Normal rate and regular rhythm.    Pulmonary/Chest: Breath sounds normal. He has no wheezes. He has no rales.   Abdominal: Soft. Bowel sounds are normal. There is no tenderness.   Lymphadenopathy:     He has no cervical adenopathy.   Neurological: He is alert and oriented to person, place, and time. He displays normal reflexes. No cranial  nerve deficit. He exhibits normal muscle tone. Coordination normal.   Skin: Skin is warm and dry.   Psychiatric: He has a normal mood and affect. His behavior is normal.       Assessment:     1. Fall, initial encounter    2. Pneumonia of right upper lobe due to infectious organism    3. Weakness    4. Irregular heart rhythm    5. Metastatic renal cell carcinoma, left    6. Transitional cell carcinoma of left kidney      Plan:   Fall, initial encounter  -     Ambulatory referral to Home Health  Cont walker    Pneumonia of right upper lobe due to infectious organism  Start avelox as prescribed by heme/onc    Weakness  -     Ambulatory referral to Home Health    Irregular heart rhythm  -     EKG 12-lead; Future; Expected date: 1/24/17  -     Ambulatory referral to Home Health    Metastatic renal cell carcinoma, left  -     Ambulatory referral to Home Health    Transitional cell carcinoma of left kidney  -     Ambulatory referral to Home Health    Other orders  -     Cancel: Ambulatory referral to Home Health        Lab Frequency Next Occurrence   CT Chest With Contrast Once 7/16/2016   CBC auto differential Once 9/7/2016   BMP Once 9/7/2016   Hepatic function panel Once 9/7/2016   Comprehensive metabolic panel Once 4/5/2017   Lipid panel Once 4/5/2017   CT Biopsy Kidney Once 1/24/2017   Renal function panel     Renal function panel     Renal function panel     Renal function panel           Return in about 4 months (around 5/24/2017), or if symptoms worsen or fail to improve.

## 2017-01-26 ENCOUNTER — CLINICAL SUPPORT (OUTPATIENT)
Dept: CARDIOLOGY | Facility: CLINIC | Age: 82
End: 2017-01-26
Payer: MEDICARE

## 2017-01-26 DIAGNOSIS — I49.9 IRREGULAR HEART RHYTHM: ICD-10-CM

## 2017-01-26 PROCEDURE — 93000 ELECTROCARDIOGRAM COMPLETE: CPT | Mod: S$GLB,,, | Performed by: INTERNAL MEDICINE

## 2017-01-30 ENCOUNTER — TELEPHONE (OUTPATIENT)
Dept: INTERNAL MEDICINE | Facility: CLINIC | Age: 82
End: 2017-01-30

## 2017-01-30 ENCOUNTER — HOSPITAL ENCOUNTER (OUTPATIENT)
Dept: RADIOLOGY | Facility: HOSPITAL | Age: 82
Discharge: HOME | End: 2017-01-30
Attending: FAMILY MEDICINE
Payer: MEDICARE

## 2017-01-30 ENCOUNTER — OFFICE VISIT (OUTPATIENT)
Dept: INTERNAL MEDICINE | Facility: CLINIC | Age: 82
End: 2017-01-30
Payer: MEDICARE

## 2017-01-30 ENCOUNTER — TELEPHONE (OUTPATIENT)
Dept: HEMATOLOGY/ONCOLOGY | Facility: CLINIC | Age: 82
End: 2017-01-30

## 2017-01-30 VITALS
OXYGEN SATURATION: 99 % | WEIGHT: 134.94 LBS | HEART RATE: 60 BPM | SYSTOLIC BLOOD PRESSURE: 120 MMHG | TEMPERATURE: 97 F | DIASTOLIC BLOOD PRESSURE: 70 MMHG | HEIGHT: 72 IN | BODY MASS INDEX: 18.28 KG/M2

## 2017-01-30 DIAGNOSIS — J90 PLEURAL EFFUSION, BACTERIAL: ICD-10-CM

## 2017-01-30 DIAGNOSIS — J18.9 CAP (COMMUNITY ACQUIRED PNEUMONIA): Primary | ICD-10-CM

## 2017-01-30 PROCEDURE — 1159F MED LIST DOCD IN RCRD: CPT | Mod: S$GLB,,, | Performed by: FAMILY MEDICINE

## 2017-01-30 PROCEDURE — 1160F RVW MEDS BY RX/DR IN RCRD: CPT | Mod: S$GLB,,, | Performed by: FAMILY MEDICINE

## 2017-01-30 PROCEDURE — 99999 PR PBB SHADOW E&M-EST. PATIENT-LVL III: CPT | Mod: PBBFAC,,, | Performed by: FAMILY MEDICINE

## 2017-01-30 PROCEDURE — 71020 XR CHEST PA AND LATERAL: CPT | Mod: 26,,, | Performed by: RADIOLOGY

## 2017-01-30 PROCEDURE — 71020 XR CHEST PA AND LATERAL: CPT | Mod: TC,PO

## 2017-01-30 PROCEDURE — 1157F ADVNC CARE PLAN IN RCRD: CPT | Mod: S$GLB,,, | Performed by: FAMILY MEDICINE

## 2017-01-30 PROCEDURE — 99214 OFFICE O/P EST MOD 30 MIN: CPT | Mod: S$GLB,,, | Performed by: FAMILY MEDICINE

## 2017-01-30 RX ORDER — ALBUTEROL SULFATE 90 UG/1
2 AEROSOL, METERED RESPIRATORY (INHALATION) EVERY 6 HOURS PRN
Qty: 1 INHALER | Refills: 0 | Status: SHIPPED | OUTPATIENT
Start: 2017-01-30 | End: 2017-02-22 | Stop reason: SDUPTHER

## 2017-01-30 NOTE — TELEPHONE ENCOUNTER
Attempted to contact patient about biopsy. Patient did not answer, voicemail left with call back number. Was able to contact niece. She is able to bring patient to scheduled appointment. Advised niece about prep.

## 2017-01-30 NOTE — TELEPHONE ENCOUNTER
Notified pt that EKG was abnormal with recommendation to f/u with cardiology and to possibly see Dr. Raygoza earlier than 2/14/17.  Earliest available appt with Dr. Raygoza is 2/14/17.  Pt stated he is having trouble breathing and shortness of breath.  Advised pt to call 911.  Pt stated it is not bad enough to call 911.  Requested appt with Dr. Payton.  Notified pt that Dr. Payton's first available appt is not until 2/2/17 and that he should be seen earlier and at the ER.  Pt stated he does not want to go to ER.  Scheduled appt with Dr. Kaba for today at 4:00 pm.

## 2017-01-30 NOTE — TELEPHONE ENCOUNTER
Atrial fibrillation on ekg. rec increase carvedilol to 6.25 bid and discuss further with cards. Earlier appt with Raygoza if possible.

## 2017-01-30 NOTE — MR AVS SNAPSHOT
[facility] Internal Medicine  [address] [facility] [place] [address]  Phone: [phone]  Fax: [phone]                  [place]eroy [place]   2017 4:00 PM   Office Visit    Description:  Male : 4/10/1927   Provider:  Siddharth Kaba MD   Department:  [facility] - Internal Medicine           Reason for Visit     Shortness of Breath           Diagnoses this Visit        Comments    CAP (community acquired pneumonia)    -  Primary     Pleural effusion, bacterial                To Do List           Future Appointments        Provider Department Dept Phone    2017 5:00 PM [facility] XR2 [facility] [phone]    2017 10:25 AM LABORATORY, [facility] [phone]    2017 11:20 AM Rafael Sultana MD [facility] Nephrology [phone]    2017 8:00 AM [facility] CT1 LIMIT 500 LBS [facility] - [phone]    2017 12:40 PM LABORATORY, [facility] [phone]      Goals (5 Years of Data)     None       These Medications        Disp Refills Start End    albuterol 90 mcg/actuation inhaler 1 Inhaler 0 2017    Inhale 2 puffs into the lungs every 6 (six) hours as needed for Wheezing or Shortness of Breath (Or Cough). - Inhalation    Pharmacy: [facility] [address] [place] [address] RD AT [facility] Ph #: [phone]         [facility]     [facility] Care [facility] -  Assistance  Registered nurses in the [facility] provide clinical advisement, health education, appointment booking, and other advisory services.  Call for this free service at [phone].             Medications           Message regarding Medications     Verify the changes and/or additions to your medication regime listed below are the same as discussed with your clinician today.  If any of these changes or additions are incorrect, please notify your healthcare provider.        START  taking these NEW medications        Refills    albuterol 90 mcg/actuation inhaler 0    Sig: Inhale 2 puffs into the lungs every 6 (six) hours as needed for Wheezing or Shortness of Breath (Or Cough).    Class: Normal    Route: Inhalation           Verify that the below list of medications is an accurate representation of the medications you are currently taking.  If none reported, the list may be blank. If incorrect, please contact your healthcare provider. Carry this list with you in case of emergency.           Current Medications     acetaminophen (TYLENOL) 650 MG TbSR Take 650 mg by mouth every 8 (eight) hours as needed.     albuterol 90 mcg/actuation inhaler Inhale 2 puffs into the lungs every 6 (six) hours as needed for Wheezing or Shortness of Breath (Or Cough).    atorvastatin (LIPITOR) 20 MG tablet Take 1 tablet (20 mg total) by mouth once daily.    carvedilol (COREG) 3.125 MG tablet TAKE ONE TABLET BY MOUTH TWICE DAILY    dutasteride (AVODART) 0.5 mg capsule TAKE 1 CAPSULE(0.5 MG) BY MOUTH EVERY DAY    esomeprazole (NEXIUM) 40 MG capsule TAKE ONE CAPSULE BY MOUTH DAILY    guaifenesin (MUCINEX) 600 mg 12 hr tablet Take 1 tablet (600 mg total) by mouth 2 (two) times daily.    Lactobacillus rhamnosus GG (CULTURELLE) 10 billion cell capsule Take 1 capsule by mouth once daily.    moxifloxacin (AVELOX) 400 mg tablet Take 1 tablet (400 mg total) by mouth once daily.    nitroGLYCERIN (NITROSTAT) 0.3 MG SL tablet As directed PRN    ondansetron (ZOFRAN) 8 MG tablet Take by mouth every 12 (twelve) hours as needed for Nausea.    senna (SENOKOT) 8.6 mg tablet Take 1 tablet by mouth once daily.    tamsulosin (FLOMAX) 0.4 mg Cp24 TAKE 1 CAPSULE(0.4 MG) BY MOUTH EVERY DAY           Clinical Reference Information           Vital Signs - Last Recorded  Most recent update: 1/30/2017  4:19 PM by Lucila Mandujano MA    BP Pulse Temp Ht Wt SpO2    120/70 60 97.4 °F (36.3 °C) (Tympanic) 6' (1.829 m) 61.2 kg (134 lb 14.7 oz)  99%    BMI                18.3 kg/m2          Blood Pressure          Most Recent Value    BP  120/70      Allergies as of 1/30/2017     Codeine    Novocain  [Procaine]      Immunizations Administered on Date of Encounter - 1/30/2017     None      Orders Placed During Today's Visit     Future Labs/Procedures Expected by Expires    X-Ray Chest PA And Lateral  1/30/2017 1/30/2018

## 2017-01-31 DIAGNOSIS — K21.9 GASTROESOPHAGEAL REFLUX DISEASE, ESOPHAGITIS PRESENCE NOT SPECIFIED: ICD-10-CM

## 2017-01-31 RX ORDER — ESOMEPRAZOLE MAGNESIUM 40 MG/1
CAPSULE, DELAYED RELEASE ORAL
Qty: 30 CAPSULE | Refills: 0 | Status: SHIPPED | OUTPATIENT
Start: 2017-01-31 | End: 2017-02-28 | Stop reason: SDUPTHER

## 2017-02-02 ENCOUNTER — TELEPHONE (OUTPATIENT)
Dept: INTERNAL MEDICINE | Facility: CLINIC | Age: 82
End: 2017-02-02

## 2017-02-02 NOTE — TELEPHONE ENCOUNTER
Notified pt. Pt verbalized understanding and stated that he is using inhaler and just finished antibiotics. Appointment scheduled for 3/3/17 @ 10:40am. Pt verbalized understanding.

## 2017-02-02 NOTE — TELEPHONE ENCOUNTER
----- Message from Siddharth Kaba MD sent at 1/30/2017  5:17 PM CST -----  No worsening of pleural effusions.  Chronic changes and worsening of pneumonia and/or perihilar lymphadenopathy.  Finish antibiotic.  Add albuterol inhaler.  Follow-up with Dr. Payton 4 weeks.  If worsening or persistent short of breath despite albuterol, will need to see pulmonology.

## 2017-02-07 ENCOUNTER — TELEPHONE (OUTPATIENT)
Dept: RADIOLOGY | Facility: HOSPITAL | Age: 82
End: 2017-02-07

## 2017-02-08 ENCOUNTER — HOSPITAL ENCOUNTER (OUTPATIENT)
Dept: RADIOLOGY | Facility: HOSPITAL | Age: 82
Discharge: HOME | End: 2017-02-08
Attending: INTERNAL MEDICINE
Payer: MEDICARE

## 2017-02-08 ENCOUNTER — TELEPHONE (OUTPATIENT)
Dept: RADIOLOGY | Facility: HOSPITAL | Age: 82
End: 2017-02-08

## 2017-02-08 VITALS
WEIGHT: 124 LBS | DIASTOLIC BLOOD PRESSURE: 80 MMHG | RESPIRATION RATE: 21 BRPM | OXYGEN SATURATION: 93 % | HEIGHT: 72 IN | HEART RATE: 90 BPM | SYSTOLIC BLOOD PRESSURE: 119 MMHG | BODY MASS INDEX: 16.8 KG/M2 | TEMPERATURE: 98 F

## 2017-02-08 DIAGNOSIS — C64.2 TRANSITIONAL CELL CARCINOMA OF LEFT KIDNEY: ICD-10-CM

## 2017-02-08 LAB
APTT BLDCRRT: 33.8 SEC
BASOPHILS # BLD AUTO: 0.02 K/UL
BASOPHILS NFR BLD: 0.3 %
DIFFERENTIAL METHOD: ABNORMAL
EOSINOPHIL # BLD AUTO: 0.1 K/UL
EOSINOPHIL NFR BLD: 1.1 %
ERYTHROCYTE [DISTWIDTH] IN BLOOD BY AUTOMATED COUNT: 17.8 %
HCT VFR BLD AUTO: 34.4 %
HGB BLD-MCNC: 10.7 G/DL
INR PPP: 1.1
LYMPHOCYTES # BLD AUTO: 1.1 K/UL
LYMPHOCYTES NFR BLD: 16.1 %
MCH RBC QN AUTO: 27.3 PG
MCHC RBC AUTO-ENTMCNC: 31.1 %
MCV RBC AUTO: 88 FL
MONOCYTES # BLD AUTO: 0.8 K/UL
MONOCYTES NFR BLD: 11.9 %
NEUTROPHILS # BLD AUTO: 5 K/UL
NEUTROPHILS NFR BLD: 70.6 %
PLATELET # BLD AUTO: 429 K/UL
PMV BLD AUTO: 8.9 FL
PROTHROMBIN TIME: 11.4 SEC
RBC # BLD AUTO: 3.92 M/UL
WBC # BLD AUTO: 7.03 K/UL

## 2017-02-08 PROCEDURE — 88305 TISSUE EXAM BY PATHOLOGIST: CPT | Mod: 26,,, | Performed by: PATHOLOGY

## 2017-02-08 PROCEDURE — 85025 COMPLETE CBC W/AUTO DIFF WBC: CPT

## 2017-02-08 PROCEDURE — 85610 PROTHROMBIN TIME: CPT

## 2017-02-08 PROCEDURE — 85730 THROMBOPLASTIN TIME PARTIAL: CPT

## 2017-02-08 PROCEDURE — 88305 TISSUE EXAM BY PATHOLOGIST: CPT | Performed by: PATHOLOGY

## 2017-02-08 PROCEDURE — 63600175 PHARM REV CODE 636 W HCPCS: Performed by: RADIOLOGY

## 2017-02-08 PROCEDURE — 50200 RENAL BIOPSY PERQ: CPT

## 2017-02-08 PROCEDURE — 88342 IMHCHEM/IMCYTCHM 1ST ANTB: CPT | Mod: 26,,, | Performed by: PATHOLOGY

## 2017-02-08 PROCEDURE — 77012 CT SCAN FOR NEEDLE BIOPSY: CPT | Mod: TC,LT

## 2017-02-08 RX ORDER — MIDAZOLAM HYDROCHLORIDE 1 MG/ML
INJECTION, SOLUTION INTRAMUSCULAR; INTRAVENOUS CODE/TRAUMA/SEDATION MEDICATION
Status: DISCONTINUED | OUTPATIENT
Start: 2017-02-08 | End: 2017-02-09 | Stop reason: HOSPADM

## 2017-02-08 RX ORDER — FENTANYL CITRATE 50 UG/ML
INJECTION, SOLUTION INTRAMUSCULAR; INTRAVENOUS CODE/TRAUMA/SEDATION MEDICATION
Status: DISCONTINUED | OUTPATIENT
Start: 2017-02-08 | End: 2017-02-09 | Stop reason: HOSPADM

## 2017-02-08 RX ADMIN — MIDAZOLAM HYDROCHLORIDE 0.5 MG: 1 INJECTION, SOLUTION INTRAMUSCULAR; INTRAVENOUS at 10:02

## 2017-02-08 RX ADMIN — FENTANYL CITRATE 25 MCG: 50 INJECTION, SOLUTION INTRAMUSCULAR; INTRAVENOUS at 10:02

## 2017-02-08 NOTE — PLAN OF CARE
Problem: Patient Care Overview  Goal: Individualization & Mutuality  Outcome: Ongoing (interventions implemented as appropriate)  Pt niece at bedside. Kidney biopsy

## 2017-02-08 NOTE — SEDATION DOCUMENTATION
procedure complete, pt tolerated well.  Pt awake, voiced no concerns or complaints at this time.  Band aid placed to left lower back puncture site, site WDL.

## 2017-02-08 NOTE — PLAN OF CARE
Problem: Patient Care Overview  Goal: Plan of Care Review  Outcome: Outcome(s) achieved Date Met:  02/08/17  Pt d/c home in stable condition via wheelchair with ride.  Verbalized understanding of d/c instructions.  Pt voiced no complaints at this time. Pt stood at side of bed, walked steps with no new motor or sensory deficits.  Neurologically intact.

## 2017-02-08 NOTE — SEDATION DOCUMENTATION
Pt in ct on table with bilateral arms at side.  Pt and family verbalized understanding  Of procedure.

## 2017-02-08 NOTE — DISCHARGE SUMMARY
Procedure was performed Alexander Conrad MD.  Sterile technique was performed in the left flank, lidocaine was used as a local anesthetic.  Multiple samples taken from left renal mass.  Pt tolerated the procedure well without immediate complications.  Please see radiologist report for details. F/u with PCP and/or ordering physician.

## 2017-02-08 NOTE — IP AVS SNAPSHOT
[facility]  [address] [place] [address] [address] [address]           Patient Discharge Instructions     Our goal is to set you up for success. This packet includes information on your condition, medications, and your home care. It will help you to care for yourself so you don't get sicker and need to go back to the hospital.     Please ask your nurse if you have any questions.        There are many details to remember when preparing to leave the hospital. Here is what you will need to do:    1. Take your medicine. If you are prescribed medications, review your Medication List in the following pages. You may have new medications to  at the pharmacy and others that you'll need to stop taking. Review the instructions for how and when to take your medications. Talk with your doctor or nurses if you are unsure of what to do.     2. Go to your follow-up appointments. Specific follow-up information is listed in the following pages. Your may be contacted by a transition nurse or clinical provider about future appointments. Be sure we have all of the phone numbers to reach you, if needed. Please contact your provider's office if you are unable to make an appointment.     3. Watch for warning signs. Your doctor or nurse will give you detailed warning signs to watch for and when to call for assistance. These instructions may also include educational information about your condition. If you experience any of warning signs to your health, call your doctor.               [facility]  Unless otherwise directed by your provider, please contact [facility], our nurse care line that is available for 24/7 assistance.     [phone] (toll-free)    Registered nurses in the [facility] provide clinical advisement, health education, appointment booking, and other advisory services.                    ** Verify the list of medication(s) below is accurate and up to date. Carry this with you  in case of emergency. If your medications have changed, please notify your healthcare provider.             Medication List      TAKE these medications        Additional Info                      acetaminophen 650 MG Tbsr   Commonly known as:  TYLENOL   Refills:  0   Dose:  650 mg    Instructions:  Take 650 mg by mouth every 8 (eight) hours as needed.     Begin Date    AM    Noon    PM    Bedtime       albuterol 90 mcg/actuation inhaler   Quantity:  1 Inhaler   Refills:  0   Dose:  2 puff    Instructions:  Inhale 2 puffs into the lungs every 6 (six) hours as needed for Wheezing or Shortness of Breath (Or Cough).     Begin Date    AM    Noon    PM    Bedtime       atorvastatin 20 MG tablet   Commonly known as:  LIPITOR   Quantity:  90 tablet   Refills:  3   Dose:  20 mg    Instructions:  Take 1 tablet (20 mg total) by mouth once daily.     Begin Date    AM    Noon    PM    Bedtime       carvedilol 3.125 MG tablet   Commonly known as:  COREG   Quantity:  180 tablet   Refills:  0    Instructions:  TAKE ONE TABLET BY MOUTH TWICE DAILY     Begin Date    AM    Noon    PM    Bedtime       CULTURELLE 10 billion cell capsule   Refills:  0   Dose:  1 capsule   Generic drug:  Lactobacillus rhamnosus GG    Instructions:  Take 1 capsule by mouth once daily.     Begin Date    AM    Noon    PM    Bedtime       dutasteride 0.5 mg capsule   Commonly known as:  AVODART   Quantity:  90 capsule   Refills:  0    Instructions:  TAKE 1 CAPSULE(0.5 MG) BY MOUTH EVERY DAY     Begin Date    AM    Noon    PM    Bedtime       esomeprazole 40 MG capsule   Commonly known as:  NEXIUM   Quantity:  30 capsule   Refills:  0    Instructions:  TAKE ONE CAPSULE BY MOUTH DAILY     Begin Date    AM    Noon    PM    Bedtime       guaifenesin 600 mg 12 hr tablet   Commonly known as:  MUCINEX   Quantity:  30 tablet   Refills:  0   Dose:  600 mg    Instructions:  Take 1 tablet (600 mg total) by mouth 2 (two) times daily.     Begin Date    AM    Noon    PM     Bedtime       nitroGLYCERIN 0.3 MG SL tablet   Commonly known as:  NITROSTAT   Quantity:  20 tablet   Refills:  1    Instructions:  As directed PRN     Begin Date    AM    Noon    PM    Bedtime       ondansetron 8 MG tablet   Commonly known as:  ZOFRAN   Refills:  0    Instructions:  Take by mouth every 12 (twelve) hours as needed for Nausea.     Begin Date    AM    Noon    PM    Bedtime       senna 8.6 mg tablet   Commonly known as:  SENOKOT   Refills:  0   Dose:  1 tablet   Indications:  Constipation    Instructions:  Take 1 tablet by mouth once daily.     Begin Date    AM    Noon    PM    Bedtime       tamsulosin 0.4 mg Cp24   Commonly known as:  FLOMAX   Quantity:  30 capsule   Refills:  11    Instructions:  TAKE 1 CAPSULE(0.4 MG) BY MOUTH EVERY DAY     Begin Date    AM    Noon    PM    Bedtime                  Please bring to all follow up appointments:    1. A copy of your discharge instructions.  2. All medicines you are currently taking in their original bottles.  3. Identification and insurance card.    Please arrive 15 minutes ahead of scheduled appointment time.    Please call 24 hours in advance if you must reschedule your appointment and/or time.        Your Scheduled Appointments     Feb 14, 2017 11:40 AM CST   Established Patient Visit with Donnie Raygoza MD   [facility] ([facility])    [address] [facility] [address] [place] [address]   [phone]            Feb 15, 2017 11:00 AM CST   Non-Fasting Lab with [facility]    [address] [facility] [address] [facility] [address]  [place] [address]   [phone]            Feb 15, 2017 11:20 AM CST   Established Patient Visit with Ankita Edge MD   [facility] Hemotology Oncology ([facility])    [address] [facility] [address] [place] [address]   [phone]            Feb 15, 2017 11:45 AM CST   Infusion 060 Min with CHAIR 01 [facility]    [address] [facility] [address] [place] [address]   [phone]            Mar 03, 2017  10:40 AM CST   Established Patient Visit with Siddharth Kaba MD   [facility] ([facility])    [address]  [place] [address] [phone]                  Discharge Instructions         Discharge Instructions for Kidney Biopsy  You had a procedure called a kidney biopsy. Your doctor used a special needle to remove a small piece of tissue from your kidney to examine it for signs of damage and disease. A kidney biopsy is ordered after other tests have shown that there may be a problem with your kidney. Kidney biopsies are also performed when kidney disease is suspected and to rule out cancer.  Home care  · Rest for 24 hours to 48 hours. Get up only to use the bathroom.  · Dont drive for 24 hours to 48 hours after the procedure.  · Dont shower for 24 hours after the biopsy. If you wish, you may wash yourself with a sponge or washcloth. When you are able to shower, dont scrub the site. Gently wash the area and pat it dry.  · Remove the bandage covering the biopsy site 24 hours to 48 hours after the procedure.  · Dont lift anything heavier than 10 pounds for 3 days to 4 days after the procedure.  · Ask your doctor when you can return to work. Be sure to tell your doctor if your job involves heavy lifting.  · If you normally take blood thinner medications (anticoagulants or antiplatelet medications) and you stopped taking them a few days before your procedure, ask your doctor when to start taking them again.  When to seek medical care  Call your doctor right away if you have any of the following:  · Blood in your urine  · Exhaustion or extreme weakness  · Dizziness or lightheadedness  · Sudden or increased shortness of breath  · Sudden chest pain  · Fever of 100.4°F (38°C) or higher, or chills  · Increasing redness, tenderness, or swelling at the biopsy site  · Opening of or drainage or bleeding from the biopsy site  · Increasing pain, with or without activity   Date Last Reviewed:  1/6/2015  © 2843-6781 [employer]. [address] [place] [address], [place] [address]. All rights reserved. This information is not intended as a substitute for professional medical care. Always follow your healthcare professional's instructions.            Admission Information     Date & Time Provider Department CSN    2/8/2017  9:00 AM Ankita Edge MD [facility] -  [id number]      Care Providers     Provider Role Specialty Primary office phone    Ankita Edge MD Attending Provider Hematology and Oncology [phone]      Your Vitals Were     BP Pulse Temp Resp Height Weight    121/68 (BP Location: Right arm, Patient Position: Lying, BP Method: Automatic) 95 97.7 °F (36.5 °C) (Oral) 22 6' (1.829 m) 56.2 kg (124 lb)    SpO2 BMI             100% 16.82 kg/m2         Recent Lab Values        5/12/2004                           8:01 AM           A1C 5.5                       Pending Labs     Order Current Status    Pathology Tissue Specimen To Pathology, Radiology Biopsy Collected (02/08/17 0932)      Allergies as of 2/8/2017        Reactions    Codeine Swelling    Novocain  [Procaine]     Other reaction(s): Dizziness      Advance Directives     An advance directive is a document which, in the event you are no longer able to make decisions for yourself, tells your healthcare team what kind of treatment you do or do not want to receive, or who you would like to make those decisions for you.  If you do not currently have an advance directive, [facility] encourages you to create one.  For more information call:  [phone]), [phone] ([phone]),  or log on to [email or web address].        Smoking Cessation     If you would like to quit smoking:   You may be eligible for free services if you are a [place] resident and started smoking cigarettes before September 1, 1988.  Call the Smoking Cessation Trust (SCT) toll free at (086) 956-5767 or (364) 460-3914.   Call  1-800-QUIT-NOW if you do not meet the above criteria.            Language Assistance Services     ATTENTION: Language assistance services are available, free of charge. Please call [phone].      ATENCIÓN: Si kathy jimenez, tiene a alvares disposición servicios gratuitos de asistencia lingüística. Llame al [phone].     CHÚ Ý: N?u b?n nói Ti?ng Vi?t, có các d?ch v? h? tr? ngôn ng? mi?n phí dành cho b?n. G?i s? [phone].        Heart Failure Education       Heart Failure: Being Active  You have a condition called heart failure. Being active doesnt mean that you have to wear yourself out. Even a little movement each day helps to strengthen your heart. If you cant get out to exercise, you can do simple stretching and strengthening exercises at home. These are good ways to keep you well-conditioned and prevent you and your heart from becoming excessively weak.    Ideas to get you started  · Add a little movement to things you do now. Walk to mail letters. Park your car at the far end of the parking lot and walk to the store. Walk up a flight of stairs instead of taking the elevator.  · Choose activities you enjoy. You might walk, swim, or ride an exercise bike. Things like gardening and washing the car count, too. Other possibilities include: washing dishes, walking the dog, walking around the mall, and doing aerobic activities with friends.  · Join a group exercise program at a [facility] or [facility], a senior center, or a community center. Or look into a hospital cardiac rehabilitation program. Ask your doctor if you qualify.  Tips to keep you going  · Get up and get dressed each day. Go to a coffee shop and read a newspaper or go somewhere that you'll be in the presence of other active people. Youll feel more like being active.  · Make a plan. Choose one or more activities that you enjoy and that you can easily do. Then plan to do at least one each day. You might write your plan on a calendar.  · Go with a  friend or a group if you like company. This can help you feel supported and stay motivated, too.  · Plan social events that you enjoy. This will keep you mentally engaged as well as physically motivated to do things you find pleasure in.  For your safety  · Talk with your healthcare provider before starting an exercise program.  · Exercise indoors when its too hot or too cold outside, or when the air quality is poor. Try walking at a shopping mall.  · Wear socks and sturdy shoes to maintain your balance and prevent falls.  · Start slowly. Do a few minutes several times a day at first. Increase your time and speed little by little.  · Stop and rest whenever you feel tired or get short of breath.  · Dont push yourself on days when you dont feel well.  Date Last Reviewed: 3/20/2016  © 0371-2394 [employer]. [address] [place] [address]. All rights reserved. This information is not intended as a substitute for professional medical care. Always follow your healthcare professional's instructions.              Heart Failure: Evaluating Your Heart  You have a condition called heart failure. To evaluate your condition, your doctor will examine you, ask questions, and do some tests. Along with looking for signs of heart failure, the doctor looks for any other health problems that may have led to heart failure. The results of your evaluation will help your doctor form a treatment plan.  Health history and physical exam  Your visit will start with a health history. Tell the doctor about any symptoms youve noticed and about all medicines you take. Then youll have a physical exam. This includes listening to your heartbeat and breathing. Youll also be checked for swelling (edema) in your legs and neck. When you have fluid buildup or fluid in the lungs, it may be called congestive heart failure.  Diagnosing heart failure     During an echocardiogram, sound waves bounce off the heart. These are  converted into a picture on the screen.   The following may be done to help your doctor form a diagnosis:  · X-rays show the size and shape of your heart. These pictures can also show fluid in your lungs.  · An electrocardiogram (ECG or EKG) shows the pattern of your heartbeat. Small pads (electrodes) are placed on your chest, arms, and legs. Wires connect the pads to the ECG machine, which records your hearts electrical signals. This can give the doctor information about heart function.  · An echocardiogram uses ultrasound waves to show the structure and movement of your heart muscle. This shows how well the heart pumps. It also shows the thickness of the heart walls, and if the heart is enlarged. It is one of the most useful, non-invasive tests as it provides information about the heart's general function. This helps your doctor make treatment decisions.  · Lab tests evaluate small amounts of blood or urine for signs of problems. A BNP lab test can help diagnose and evaluate heart failure. BNP stands for B-type natriuretic peptide. The ventricles secrete more BNP when heart failure worsens. Lab tests can also provide information about metabolic dysfunction or heart dysfunction.  Your treatment plan  Based on the results of your evaluation and tests, your doctor will develop a treatment plan. This plan is designed to relieve some of your heart failure symptoms and help make you more comfortable. Your treatment plan may include:  · Medicine to help your heart work better and improve your quality of life  · Changes in what you eat and drink to help prevent fluid from backing up in your body  · Daily monitoring of your weight and heart failure symptoms to see how well your treatment plan is working  · Exercise to help you stay healthy  · Help with quitting smoking  · Emotional and psychological support to help adjust to the changes  · Referrals to other specialists to make sure you are being treated  comprehensively  Date Last Reviewed: 3/21/2016  © 2264-6893 [employer]. [address] [place] [address], [place] [address]. All rights reserved. This information is not intended as a substitute for professional medical care. Always follow your healthcare professional's instructions.              Heart Failure: Making Changes to Your Diet  You have a condition called heart failure. When you have heart failure, excess fluid is more likely to build up in your body because your heart isn't working well. This makes the heart work harder to pump blood. Fluid buildup causes symptoms such as shortness of breath and swelling (edema). This is often referred to as congestive heart failure or CHF. Controlling the amount of salt (sodium) you eat may help stop fluid from building up. Your doctor may also tell you to reduce the amount of fluid you drink.  Reading food labels    Your healthcare provider will tell you how much sodium you can eat each day. Read food labels to keep track. Keep in mind that certain foods are high in salt. These include canned, frozen, and processed foods. Check the amount of sodium in each serving. Watch out for high-sodium ingredients. These include MSG (monosodium glutamate), baking soda, and sodium phosphate.   Eating less salt  Give yourself time to get used to eating less salt. It may take a little while. Here are some tips to help:  · Take the saltshaker off the table. Replace it with salt-free herb mixes and spices.  · Eat fresh or plain frozen vegetables. These have much less salt than canned vegetables.  · Choose low-sodium snacks like sodium-free pretzels, crackers, or air-popped popcorn.  · Dont add salt to your food when youre cooking. Instead, season your foods with pepper, lemon, garlic, or onion.  · When you eat out, ask that your food be cooked without added salt.  · Avoid eating fried foods as these often have a great deal of salt.  If youre told to limit fluids  You may need  to limit how much fluid you have to help prevent swelling. This includes anything that is liquid at room temperature, such as ice cream and soup. If your doctor tells you to limit fluid, try these tips:  · Measure drinks in a measuring cup before you drink them. This will help you meet daily goals.  · Chill drinks to make them more refreshing.  · Suck on frozen lemon wedges to quench thirst.  · Only drink when youre thirsty.  · Chew sugarless gum or suck on hard candy to keep your mouth moist.  · Weigh yourself daily to know if your body's fluid content is rising.  My sodium goal  Your healthcare provider may give you a sodium goal to meet each day. This includes sodium found in food as well as salt that you add. My goal is to eat no more than ___________ mg of sodium per day.     When to call your doctor  Call your doctor right away if you have any symptoms of worsening heart failure. These can include:  · Sudden weight gain  · Increased swelling of your legs or ankles  · Trouble breathing when youre resting or at night  · Increase in the number of pillows you have to sleep on  · Chest pain, pressure, discomfort, or pain in the jaw, neck, or back   Date Last Reviewed: 3/21/2016  © 8475-4346 [employer]. [address] [place] [address], [place] [address]. All rights reserved. This information is not intended as a substitute for professional medical care. Always follow your healthcare professional's instructions.              Heart Failure: Medicines to Help Your Heart    You have a condition called heart failure (also known as congestive heart failure, or CHF). Your doctor will likely prescribe medicines for heart failure and any underlying health problems you have. Most heart failure patients take one or more types of medicinen. Your healthcare provider will work to find the combination of medicines that works best for you.  Heart failure medicines  Here are the most common heart failure medicines:  · ACE  inhibitors lower blood pressure and decrease strain on the heart. This makes it easier for the heart to pump. Angiotensin receptor blockers have similar effects. These are prescribed for some patients instead of ACE inhibitors.  · Beta-blockers relieve stress on the heart. They also improve symptoms. They may also improve the heart's pumping action over time.  · Diuretics (also called water pills) help rid your body of excess water. This can help rid your body of swelling (edema). Having less fluid to pump means your heart doesnt have to work as hard. Some diuretics make your body lose a mineral called potassium. Your doctor will tell you if you need to take supplements or eat more foods high in potassium.  · Digoxin helps your heart pump with more strength. This helps your heart pump more blood with each beat. So, more oxygen-rich blood travels to the rest of the body.  · Aldosterone antagonists help alter hormones and decrease strain on the heart.  · Hydralazine and nitrates are two separate medicines used together to treat heart failure. They may come in one combination pill. They lower blood pressure and decrease how hard the heart has to pump.  Medicines for related conditions  Controlling other heart problems helps keep heart failure under control, too. Depending on other heart problems you have, medicines may be prescribed to:  · Lower blood pressure (antihypertensives).  · Lower cholesterol levels (statins).  · Prevent blood clots (anticoagulants or aspirin).  · Keep the heartbeat steady (antiarrhythmics).  Date Last Reviewed: 3/5/2016  © 7870-5961 [employer]. [address] [place] [address], [place] [address]. All rights reserved. This information is not intended as a substitute for professional medical care. Always follow your healthcare professional's instructions.              Heart Failure: Procedures That May Help    The heart is a muscle that pumps oxygen-rich blood to all parts of the  body. When you have heart failure, the heart is not able to pump as well as it should. Blood and fluid may back up into the lungs (congestive heart failure), and some parts of the body dont get enough oxygen-rich blood to work normally. These problems lead to the symptoms of heart failure.     Certain procedures may help the heart pump better in some cases of heart failure. Some procedures are done to treat health problems that may have caused the heart failure such as coronary artery disease or heart rhythm problems. For more serious heart failure, other options are available.  Treating artery and valve problems  If you have coronary artery disease or valve disease, procedures may be done to improve blood flow. This helps the heart pump better, which can improve heart failure symptoms. First, your doctor may do a cardiac catheterization to help detect clogged blood vessels or valve damage. During this procedure, a  thin tube (catheter) in inserted into a blood vessel and guided to the heart. There a dye is injected and a special type of X-ray (angiogram) is taken of the blood vessels. Procedures to open a blocked artery or fix damaged valves can also be done using catheterization.  · Angioplasty uses a balloon-tipped instrument at the end of the catheter. The balloon is inflated to widen the narrowed artery. In many cases, a stent is expanded to further support the narrowed artery. A stent is a metal mesh tube.  · Valve surgery repairs or replacement of faulty valves can also be done during catheterization so blood can flow properly through the chambers of the heart.  Bypass surgery is another option to help treat blocked arteries. It uses a healthy blood vessel from elsewhere in the body. The healthy blood vessel is attached above and below the blocked area so that blood can flow around the blocked artery.  Treating heart rhythm problems  A device may be placed in the chest to help a weak heart maintain a  healthy, heartbeat so the heart can pump more effectively:  · Pacemaker. A pacemaker is an implanted device that regulates your heartbeat electronically. It monitors your heart's rhythm and generates a painless electric impulse that helps the heart beat in a regular rhythm. A pacemaker is programmed to meet your specific heart rhythm needs.  · Biventricular pacing/cardiac resynchronization therapy. A type of pacemaker that paces both pumping chambers of the heart at the same time to coordinate contractions and to improve the heart's function. Some people with heart failure are candidates for this therapy.  · Implantable cardioverter defibrillator. A device similar to a pacemaker that senses when the heart is beating too fast and delivers an electrical shock to convert the fast rhythm to a normal rhythm. This can be a life saving device.  In severe cases  In more serious cases of heart failure when other treatments no longer work, other options may include:  · Ventricular assist devices (VADs). These are mechanical devices used to take over the pumping function for one or both of the heart's ventricles, or pumping chambers. A VAD may be necessary when heart failure progresses to the point that medicines and other treatments no longer help. In some cases, a VAD may be used as a bridge to transplant.  · Heart transplant. This is replacing the diseased heart with a healthy one from a donor. This is an option for a few people who are very sick. A heart transplant is very serious and not an option for all patients. Your doctor can tell you more.  Date Last Reviewed: 3/20/2016  © 8639-2863 [employer]. [address] [place] [address], [place] [address]. All rights reserved. This information is not intended as a substitute for professional medical care. Always follow your healthcare professional's instructions.              Heart Failure: Tracking Your Weight  You have a condition called heart failure. When you have  heart failure, a sudden weight gain or a steady rise in weight is a warning sign that your body is retaining too much water and salt. This could mean your heart failure is getting worse. If left untreated, it can cause problems for your lungs and result in shortness of breath. Weighing yourself each day is the best way to know if youre retaining water. If your weight goes up quickly, call your doctor. You will be given instructions on how to get rid of the excess water. You will likely need medicines and to avoid salt. This will help your heart work better.  Call your doctor if you gain more than 2 pounds in 1 day, more than 5 pounds in 1 week, or whatever weight gain you were told to report by your doctor. This is often a sign of worsening heart failure and needs to be evaluated and treated. Your doctor will tell you what to do next.   Tips for weighing yourself    · Weigh yourself at the same time each morning, wearing the same clothes. Weigh yourself after urinating and before eating.  · Use the same scale each day. Make sure the numbers are easy to read. Put the scale on a flat, hard surface -- not on a rug or carpet.  · Do not stop weighing yourself. If you forget one day, weigh again the next morning.  How to use your weight chart  · Keep your weight chart near the scale. Write your weight on the chart as soon as you get off the scale.  · Fill in the month and the start date on the chart. Then write down your weight each day. Your chart will look like this:    · If you miss a day, leave the space blank. Weigh yourself the next day and write your weight in the next space.  · Take your weight chart with you when you go to see your doctor.  Date Last Reviewed: 3/20/2016  © 2909-9267 [employer]. [address] [place] [address], [place] [address]. All rights reserved. This information is not intended as a substitute for professional medical care. Always follow your healthcare professional's  instructions.              Heart Failure: Warning Signs of a Flare-Up  You have a condition called heart failure. Once you have heart failure, flare-ups can happen. Below are signs that can mean your heart failure is getting worse. If you notice any of these warning signs, call your healthcare provider.  Swelling    · Your feet, ankles, or lower legs get puffier.  · You notice skin changes on your lower legs.  · Your shoes feel too tight.  · Your clothes are tighter in the waist.  · You have trouble getting rings on or off your fingers.  Shortness of breath  · You have to breathe harder even when youre doing your normal activities or when youre resting.  · You are short of breath walking up stairs or even short distances.  · You wake up at night short of breath or coughing.  · You need to use more pillows or sit up to sleep.  · You wake up tired or restless.  Other warning signs  · You feel weaker, dizzy, or more tired.  · You have chest pain or changes in your heartbeat.  · You have a cough that wont go away.  · You cant remember things or dont feel like eating.  Tracking your weight  Gaining weight is often the first warning sign that heart failure is getting worse. Gaining even a few pounds can be a sign that your body is retaining excess water and salt. Weighing yourself each day in the morning after you urinate and before you eat, is the best way to know if you're retaining water. Get a scale that is easy to read and make sure you wear the same clothes and use the same scale every time you weigh. Your healthcare provider will show you how to track your weight. Call your doctor if you gain more than 2 pounds in 1 day, 5 pounds in 1 week, or whatever weight gain you were told to report by your doctor. This is often a sign of worsening heart failure and needs to be evaluated and treated before it compromises your breathing. Your doctor will tell you what to do next.    Date Last Reviewed: 3/15/2016  ©  3478-7476 [employer]. [address] [place] [address], [place] [address]. All rights reserved. This information is not intended as a substitute for professional medical care. Always follow your healthcare professional's instructions.              Pneumonmia Discharge Instructions                Chronic Kindey Disease Education              [facility]  complies with applicable Federal civil rights laws and does not discriminate on the basis of race, color, national origin, age, disability, or sex.

## 2017-02-09 ENCOUNTER — TELEPHONE (OUTPATIENT)
Dept: INTERNAL MEDICINE | Facility: CLINIC | Age: 82
End: 2017-02-09

## 2017-02-09 NOTE — TELEPHONE ENCOUNTER
----- Message from Jasmin Orozco sent at 2/9/2017 11:03 AM CST -----  Contact: Delores/[facility]  Delores called to speak with the nurse; she stated the patient hasn't been able to sleep at night. Wants to know if he can take something OTC or if Dr. Payton wants to prescribe something for him. She can be contacted at [phone].    Thanks,  Jasmin

## 2017-02-14 ENCOUNTER — OFFICE VISIT (OUTPATIENT)
Dept: CARDIOLOGY | Facility: CLINIC | Age: 82
End: 2017-02-14
Payer: MEDICARE

## 2017-02-14 ENCOUNTER — TELEPHONE (OUTPATIENT)
Dept: HEMATOLOGY/ONCOLOGY | Facility: CLINIC | Age: 82
End: 2017-02-14

## 2017-02-14 VITALS
BODY MASS INDEX: 17.17 KG/M2 | SYSTOLIC BLOOD PRESSURE: 106 MMHG | WEIGHT: 126.75 LBS | HEIGHT: 72 IN | HEART RATE: 92 BPM | DIASTOLIC BLOOD PRESSURE: 70 MMHG

## 2017-02-14 DIAGNOSIS — I10 ESSENTIAL HYPERTENSION: ICD-10-CM

## 2017-02-14 DIAGNOSIS — I25.10 CORONARY ARTERY DISEASE INVOLVING NATIVE CORONARY ARTERY OF NATIVE HEART WITHOUT ANGINA PECTORIS: Primary | ICD-10-CM

## 2017-02-14 DIAGNOSIS — I47.9 PAROXYSMAL TACHYCARDIA: ICD-10-CM

## 2017-02-14 DIAGNOSIS — R06.00 DYSPNEA AND RESPIRATORY ABNORMALITIES: ICD-10-CM

## 2017-02-14 DIAGNOSIS — R06.89 DYSPNEA AND RESPIRATORY ABNORMALITIES: ICD-10-CM

## 2017-02-14 PROCEDURE — 99213 OFFICE O/P EST LOW 20 MIN: CPT | Mod: S$GLB,,, | Performed by: INTERNAL MEDICINE

## 2017-02-14 PROCEDURE — 99499 UNLISTED E&M SERVICE: CPT | Mod: S$GLB,,, | Performed by: INTERNAL MEDICINE

## 2017-02-14 PROCEDURE — 1159F MED LIST DOCD IN RCRD: CPT | Mod: S$GLB,,, | Performed by: INTERNAL MEDICINE

## 2017-02-14 PROCEDURE — 1160F RVW MEDS BY RX/DR IN RCRD: CPT | Mod: S$GLB,,, | Performed by: INTERNAL MEDICINE

## 2017-02-14 PROCEDURE — 99999 PR PBB SHADOW E&M-EST. PATIENT-LVL III: CPT | Mod: PBBFAC,,, | Performed by: INTERNAL MEDICINE

## 2017-02-14 PROCEDURE — 1157F ADVNC CARE PLAN IN RCRD: CPT | Mod: S$GLB,,, | Performed by: INTERNAL MEDICINE

## 2017-02-14 NOTE — TELEPHONE ENCOUNTER
Attempted to contact patient about Dr Edge's update. Patient did not answer, voicemail left with call back number.

## 2017-02-14 NOTE — MR AVS SNAPSHOT
[facility]  [address] [facility] [place] [address]  Phone: [phone]  Fax: [phone]                  Leeroy Lake   2017 11:40 AM   Office Visit    Description:  Male : 4/10/1927   Provider:  Donnie Raygoza MD   Department:  [facility]           Reason for Visit     Coronary Artery Disease           Diagnoses this Visit        Comments    Coronary artery disease involving native coronary artery of native heart without angina pectoris    -  Primary     Essential hypertension         Paroxysmal tachycardia         Dyspnea and respiratory abnormalities                To Do List           Future Appointments        Provider Department Dept Phone    2/15/2017 11:00 AM LABORATORY, [facility] [phone]    2/15/2017 11:20 AM Ankita Edge MD [facility] Hemotology Oncology [phone]    2/15/2017 11:45 AM CHAIR 01 [facility] [phone]    3/3/2017 10:40 AM Siddharth Kaba MD [facility] Internal Medicine [phone]    2017 8:20 AM LABORATORY, [facility] [phone]      Goals (5 Years of Data)     None      Follow-Up and Disposition     Return in about 6 months (around 2017).      [facility]     [facility] Nurse Care Line - 24/7 Assistance  Registered nurses in the [facility] provide clinical advisement, health education, appointment booking, and other advisory services.  Call for this free service at [phone].             Medications           Message regarding Medications     Verify the changes and/or additions to your medication regime listed below are the same as discussed with your clinician today.  If any of these changes or additions are incorrect, please notify your healthcare provider.        STOP taking these medications     senna (SENOKOT) 8.6 mg tablet Take 1 tablet by mouth once daily.           Verify that the below list of medications is an  accurate representation of the medications you are currently taking.  If none reported, the list may be blank. If incorrect, please contact your healthcare provider. Carry this list with you in case of emergency.           Current Medications     acetaminophen (TYLENOL) 650 MG TbSR Take 650 mg by mouth every 8 (eight) hours as needed.     albuterol 90 mcg/actuation inhaler Inhale 2 puffs into the lungs every 6 (six) hours as needed for Wheezing or Shortness of Breath (Or Cough).    atorvastatin (LIPITOR) 20 MG tablet Take 1 tablet (20 mg total) by mouth once daily.    carvedilol (COREG) 3.125 MG tablet TAKE ONE TABLET BY MOUTH TWICE DAILY    dutasteride (AVODART) 0.5 mg capsule TAKE 1 CAPSULE(0.5 MG) BY MOUTH EVERY DAY    esomeprazole (NEXIUM) 40 MG capsule TAKE ONE CAPSULE BY MOUTH DAILY    Lactobacillus rhamnosus GG (CULTURELLE) 10 billion cell capsule Take 1 capsule by mouth once daily.    nitroGLYCERIN (NITROSTAT) 0.3 MG SL tablet As directed PRN    tamsulosin (FLOMAX) 0.4 mg Cp24 TAKE 1 CAPSULE(0.4 MG) BY MOUTH EVERY DAY    guaifenesin (MUCINEX) 600 mg 12 hr tablet Take 1 tablet (600 mg total) by mouth 2 (two) times daily.    ondansetron (ZOFRAN) 8 MG tablet Take by mouth every 12 (twelve) hours as needed for Nausea.           Clinical Reference Information           Your Vitals Were     BP Pulse Height Weight BMI    106/70 (BP Location: Left arm) 92 6' (1.829 m) 57.5 kg (126 lb 12.2 oz) 17.19 kg/m2      Blood Pressure          Most Recent Value    BP  106/70      Allergies as of 2/14/2017     Codeine    Novocain  [Procaine]      Immunizations Administered on Date of Encounter - 2/14/2017     None      Language Assistance Services     ATTENTION: Language assistance services are available, free of charge. Please call [phone].      ATENCIÓN: Si kella tony, tiene a alvares disposición servicios gratuitos de asistencia lingüística. Llame al [phone].     CHÚ Ý: N?u b?n nói Ti?ng Vi?t, có các d?ch v? h?  tr? monet valdez? mi?n phí dành cho b?n. G?i s? [phone].         [facility] complies with applicable Federal civil rights laws and does not discriminate on the basis of race, color, national origin, age, disability, or sex.

## 2017-02-14 NOTE — PROGRESS NOTES
Subjective:   Patient ID:  Leeroy Lake is a 89 y.o. male who presents for follow up of Coronary Artery Disease      HPI Comments: 87 yo, male, Moved down from  recently in  and lives in assistant living. His wife  of Dementia after moving to here. Prior cardiologist Dr. Calles at [facility].  PMH CAD. PAF after PNA recently, JOSÉ LUIS on CPAP, HTN, moderate AI, metastatic transitional renal cell carcinoma and History of gastric MALT Dx in  .  Current on palliativec ChemoRX. By Dr. Edge, discontinue Atezolizumab given disease progression and have requested insurance authorization for Nivolumab as it has activity against RCC and transitional cell carcinoma. If insurance denies coverage, consider single agent Pemetrexed (will need to start folic acid and VitB12).  Pt states that has had cough and sputum for two weeks. Finished ATx recently and still has cough and sputum with dyspnea and fatigue. NO fever, chill.    EKG on 2016 NSR, HR 78 bpm  ECHO in :  1 - Biatrial enlargement.   2 - Normal left ventricular systolic function (EF 55-60%).   3 - Left ventricular diastolic dysfunction.   4 - Normal right ventricular systolic function .   5 - Moderate aortic regurgitation.   6 - Trivial mitral regurgitation.   7 - Trivial tricuspid regurgitation.             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00     Quit date: 8/23/1970    Smokeless tobacco: Never Used    Alcohol use 6.0 oz/week     10 Glasses of wine per week      Comment:  1-2 glasses wine/ day       Family History   Problem Relation Age of Onset    Heart disease Mother     Cancer Sister      lymphoma    Heart disease Brother     Stroke Brother     Amblyopia Neg Hx     Blindness Neg Hx     Diabetes Neg Hx     Glaucoma Neg Hx     Macular degeneration Neg Hx     Retinal detachment Neg Hx     Strabismus Neg Hx     Thyroid disease Neg Hx     Hypertension Neg Hx     Cataracts Neg Hx          Review of Systems   Constitution: Positive for malaise/fatigue. Negative for decreased appetite, diaphoresis, fever, weakness and night sweats.   HENT: Negative for headaches and nosebleeds.    Eyes: Negative for blurred vision and double vision.   Cardiovascular: Positive for dyspnea on exertion. Negative for chest pain, claudication, irregular heartbeat, leg  swelling, near-syncope, orthopnea, palpitations, paroxysmal nocturnal dyspnea and syncope.   Respiratory: Positive for cough, shortness of breath and sputum production. Negative for sleep disturbances due to breathing, snoring and wheezing.    Endocrine: Negative for cold intolerance and polyuria.   Hematologic/Lymphatic: Does not bruise/bleed easily.   Skin: Negative for rash.   Musculoskeletal: Negative for back pain, falls, joint pain, joint swelling and neck pain.   Gastrointestinal: Negative for abdominal pain, heartburn, nausea and vomiting.   Genitourinary: Negative for dysuria, frequency and hematuria.   Neurological: Negative for difficulty with concentration, dizziness, focal weakness, light-headedness, numbness and seizures.   Psychiatric/Behavioral: Negative for depression, memory loss and substance abuse. The patient does not have insomnia.    Allergic/Immunologic: Negative for HIV exposure and hives.       Objective:   Physical Exam   Constitutional: He is oriented to person, place, and time. He appears well-nourished.   HENT:   Head: Normocephalic.   Eyes: Pupils are equal, round, and reactive to light.   Neck: Normal carotid pulses and no JVD present. Carotid bruit is not present. No thyromegaly present.   Cardiovascular: Normal rate, regular rhythm, normal heart sounds and normal pulses.   No extrasystoles are present. PMI is not displaced.  Exam reveals no gallop and no S3.    No murmur heard.  Pulmonary/Chest: Breath sounds normal. No stridor. No respiratory distress.   + fine crackles   Abdominal: Soft. Bowel sounds are normal. There is no tenderness. There is no rebound.   Musculoskeletal: Normal range of motion.   Neurological: He is alert and oriented to person, place, and time.   Skin: Skin is intact. No rash noted.   Psychiatric: His behavior is normal.       Lab Results   Component Value Date    CHOL 149 08/16/2014    CHOL 162 06/24/2013    CHOL 163 02/11/2012     Lab Results   Component  Value Date    HDL 38 (L) 08/16/2014    HDL 57 06/24/2013    HDL 45 02/11/2012     Lab Results   Component Value Date    LDLCALC 88.6 08/16/2014    LDLCALC 85.0 06/24/2013    LDLCALC 92.0 02/11/2012     Lab Results   Component Value Date    TRIG 112 08/16/2014    TRIG 98 06/24/2013    TRIG 129 02/11/2012     Lab Results   Component Value Date    CHOLHDL 25.5 08/16/2014    CHOLHDL 35.2 06/24/2013    CHOLHDL 27.6 02/11/2012       Chemistry        Component Value Date/Time     01/24/2017 1312    K 4.2 01/24/2017 1312     01/24/2017 1312    CO2 18 (L) 01/24/2017 1312    BUN 33 (H) 01/24/2017 1312    CREATININE 1.5 (H) 01/24/2017 1312     (H) 01/24/2017 1312        Component Value Date/Time    CALCIUM 9.8 01/24/2017 1312    ALKPHOS 77 01/24/2017 1312    AST 19 01/24/2017 1312    AST 31 02/03/2016 1110    ALT 18 01/24/2017 1312    BILITOT 0.3 01/24/2017 1312          Lab Results   Component Value Date    TSH 0.431 12/13/2016     Lab Results   Component Value Date    INR 1.1 02/08/2017    INR 1.0 06/25/2007     Lab Results   Component Value Date    WBC 7.03 02/08/2017    HGB 10.7 (L) 02/08/2017    HCT 34.4 (L) 02/08/2017    MCV 88 02/08/2017     (H) 02/08/2017     BMP  Sodium   Date Value Ref Range Status   01/24/2017 138 136 - 145 mmol/L Final     Potassium   Date Value Ref Range Status   01/24/2017 4.2 3.5 - 5.1 mmol/L Final     Chloride   Date Value Ref Range Status   01/24/2017 105 95 - 110 mmol/L Final     CO2   Date Value Ref Range Status   01/24/2017 18 (L) 23 - 29 mmol/L Final     BUN, Bld   Date Value Ref Range Status   01/24/2017 33 (H) 8 - 23 mg/dL Final     Creatinine   Date Value Ref Range Status   01/24/2017 1.5 (H) 0.5 - 1.4 mg/dL Final     Calcium   Date Value Ref Range Status   01/24/2017 9.8 8.7 - 10.5 mg/dL Final     Anion Gap   Date Value Ref Range Status   01/24/2017 15 8 - 16 mmol/L Final     eGFR if    Date Value Ref Range Status   01/24/2017 47 (A) >60  mL/min/1.73 m^2 Final     eGFR if non    Date Value Ref Range Status   01/24/2017 41 (A) >60 mL/min/1.73 m^2 Final     Comment:     Calculation used to obtain the estimated glomerular filtration  rate (eGFR) is the CKD-EPI equation. Since race is unknown   in our information system, the eGFR values for   -American and Non--American patients are given   for each creatinine result.       CrCl cannot be calculated (Patient has no serum creatinine result on file.).     Assessment:      1. Coronary artery disease involving native coronary artery of native heart without angina pectoris    2. Essential hypertension    3. Paroxysmal tachycardia    4. Dyspnea and respiratory abnormalities        Plan:   Continue mucinex and inhaler for cough.  Continue coreg and lipitor  RTC in 6 months

## 2017-02-15 ENCOUNTER — OFFICE VISIT (OUTPATIENT)
Dept: HEMATOLOGY/ONCOLOGY | Facility: CLINIC | Age: 82
End: 2017-02-15
Payer: MEDICARE

## 2017-02-15 ENCOUNTER — TELEPHONE (OUTPATIENT)
Dept: HEMATOLOGY/ONCOLOGY | Facility: CLINIC | Age: 82
End: 2017-02-15

## 2017-02-15 ENCOUNTER — INFUSION (OUTPATIENT)
Dept: INFUSION THERAPY | Facility: HOSPITAL | Age: 82
End: 2017-02-15
Attending: INTERNAL MEDICINE
Payer: MEDICARE

## 2017-02-15 VITALS
BODY MASS INDEX: 17.17 KG/M2 | OXYGEN SATURATION: 98 % | WEIGHT: 126.75 LBS | TEMPERATURE: 98 F | HEART RATE: 93 BPM | HEIGHT: 72 IN | DIASTOLIC BLOOD PRESSURE: 70 MMHG | SYSTOLIC BLOOD PRESSURE: 110 MMHG | RESPIRATION RATE: 18 BRPM

## 2017-02-15 DIAGNOSIS — C68.9 UROTHELIAL CARCINOMA: Primary | ICD-10-CM

## 2017-02-15 DIAGNOSIS — C77.8 MALIGNANT NEOPLASM METASTATIC TO LYMPH NODES OF MULTIPLE SITES: ICD-10-CM

## 2017-02-15 DIAGNOSIS — J18.9 PNEUMONIA OF RIGHT UPPER LOBE DUE TO INFECTIOUS ORGANISM: ICD-10-CM

## 2017-02-15 DIAGNOSIS — G47.01 INSOMNIA DUE TO MEDICAL CONDITION: ICD-10-CM

## 2017-02-15 PROCEDURE — 96372 THER/PROPH/DIAG INJ SC/IM: CPT | Mod: PO

## 2017-02-15 PROCEDURE — 99214 OFFICE O/P EST MOD 30 MIN: CPT | Mod: S$GLB,,, | Performed by: INTERNAL MEDICINE

## 2017-02-15 PROCEDURE — 1126F AMNT PAIN NOTED NONE PRSNT: CPT | Mod: S$GLB,,, | Performed by: INTERNAL MEDICINE

## 2017-02-15 PROCEDURE — 99999 PR PBB SHADOW E&M-EST. PATIENT-LVL IV: CPT | Mod: PBBFAC,,, | Performed by: INTERNAL MEDICINE

## 2017-02-15 PROCEDURE — 1159F MED LIST DOCD IN RCRD: CPT | Mod: S$GLB,,, | Performed by: INTERNAL MEDICINE

## 2017-02-15 PROCEDURE — 63600175 PHARM REV CODE 636 W HCPCS: Mod: PO | Performed by: INTERNAL MEDICINE

## 2017-02-15 PROCEDURE — 1160F RVW MEDS BY RX/DR IN RCRD: CPT | Mod: S$GLB,,, | Performed by: INTERNAL MEDICINE

## 2017-02-15 PROCEDURE — 1157F ADVNC CARE PLAN IN RCRD: CPT | Mod: S$GLB,,, | Performed by: INTERNAL MEDICINE

## 2017-02-15 PROCEDURE — 99499 UNLISTED E&M SERVICE: CPT | Mod: S$GLB,,, | Performed by: INTERNAL MEDICINE

## 2017-02-15 RX ORDER — ZOLPIDEM TARTRATE 5 MG/1
5 TABLET ORAL NIGHTLY PRN
Qty: 10 TABLET | Refills: 0 | Status: SHIPPED | OUTPATIENT
Start: 2017-02-15 | End: 2017-02-22

## 2017-02-15 RX ORDER — AMOXICILLIN AND CLAVULANATE POTASSIUM 875; 125 MG/1; MG/1
1 TABLET, FILM COATED ORAL 2 TIMES DAILY
Qty: 20 TABLET | Refills: 0 | Status: SHIPPED | OUTPATIENT
Start: 2017-02-15 | End: 2017-02-25

## 2017-02-15 RX ORDER — CYANOCOBALAMIN 1000 UG/ML
1000 INJECTION, SOLUTION INTRAMUSCULAR; SUBCUTANEOUS
Status: CANCELLED
Start: 2017-02-15

## 2017-02-15 RX ORDER — FOLIC ACID 1 MG/1
1 TABLET ORAL DAILY
Qty: 100 TABLET | Refills: 3 | Status: SHIPPED | OUTPATIENT
Start: 2017-02-15 | End: 2018-02-15

## 2017-02-15 RX ORDER — DEXAMETHASONE 4 MG/1
4 TABLET ORAL 2 TIMES DAILY
Qty: 50 TABLET | Refills: 1 | Status: SHIPPED | OUTPATIENT
Start: 2017-02-15 | End: 2018-02-15

## 2017-02-15 RX ORDER — CYANOCOBALAMIN 1000 UG/ML
1000 INJECTION, SOLUTION INTRAMUSCULAR; SUBCUTANEOUS
Status: COMPLETED | OUTPATIENT
Start: 2017-02-15 | End: 2017-02-15

## 2017-02-15 RX ADMIN — CYANOCOBALAMIN 1000 MCG: 1000 INJECTION, SOLUTION INTRAMUSCULAR at 01:02

## 2017-02-15 NOTE — TELEPHONE ENCOUNTER
----- Message from Ginette Cassidy sent at 2/15/2017  2:30 PM CST -----  Contact: ms perear  Would like to speak to nurse about pt. Please call back at [phone]. Thanks//cdb

## 2017-02-15 NOTE — PATIENT INSTRUCTIONS
.  [facility]  [address]  [address] [address]  [phone] phone     [phone] fax  Hours of Operation: Monday- Friday 8:00am- 5:00pm  After hours phone  [phone]  Hematology / Oncology Physicians on call      Dr. Jc Edge    Please call with any concerns regarding your appointment today.  .FALL PREVENTION   Falls often occur due to slipping, tripping or losing your balance. Here are ways to reduce your risk of falling again.   Was there anything that caused your fall that can be fixed, removed or replaced?   Make your home safe by keeping walkways clear of objects you may trip over.   Use non-slip pads under rugs.   Do not walk in poorly lit areas.   Do not stand on chairs or wobbly ladders.   Use caution when reaching overhead or looking upward. This position can cause a loss of balance.   Be sure your shoes fit properly, have non-slip bottoms and are in good condition.   Be cautious when going up and down stairs, curbs, and when walking on uneven sidewalks.   If your balance is poor, consider using a cane or walker.   If your fall was related to alcohol use, stop or limit alcohol intake.   If your fall was related to use of sleeping medicines, talk to your doctor about this. You may need to reduce your dosage at bedtime if you awaken during the night to go to the bathroom.   To reduce the need for nighttime bathroom trips:   Avoid drinking fluids for several hours before going to bed   Empty your bladder before going to bed   Men can keep a urinal at the bedside   © 1202-4109 [employer], [address] [place] [address], [place] [address]. All rights reserved. This information is not intended as a substitute for professional medical care. Always follow your healthcare professional's instructions.

## 2017-02-15 NOTE — PATIENT INSTRUCTIONS
Chemotherapy instructions:  1. Start taking folic acid 1mg daily  2. The morning before chemotherapy, start taking Dexamethasone 4mg twice a day and continue this for 3 days (so you will take this medication twice a day on Tuesday, Wednesday, and Thursday on the weeks that you get chemotherapy).

## 2017-02-15 NOTE — MR AVS SNAPSHOT
Patient Information     Patient Name Sex Leeroy Lopez Male 4/10/1927      Visit Information        Provider Department Dept Phone Center    2/15/2017 1:00 PM [facility] Chemo Infusion [facility] Chemotherapy Infusion [phone] [facility]      Patient Instructions    .  [facility]  [address] [facility] [address]  [address]  [phone] phone     [phone] fax  Hours of Operation: Monday- Friday 8:00am- 5:00pm  After hours phone  [phone]  Hematology / Oncology Physicians on call      Dr. Jc Edge    Please call with any concerns regarding your appointment today.  .FALL PREVENTION   Falls often occur due to slipping, tripping or losing your balance. Here are ways to reduce your risk of falling again.   Was there anything that caused your fall that can be fixed, removed or replaced?   Make your home safe by keeping walkways clear of objects you may trip over.   Use non-slip pads under rugs.   Do not walk in poorly lit areas.   Do not stand on chairs or wobbly ladders.   Use caution when reaching overhead or looking upward. This position can cause a loss of balance.   Be sure your shoes fit properly, have non-slip bottoms and are in good condition.   Be cautious when going up and down stairs, curbs, and when walking on uneven sidewalks.   If your balance is poor, consider using a cane or walker.   If your fall was related to alcohol use, stop or limit alcohol intake.   If your fall was related to use of sleeping medicines, talk to your doctor about this. You may need to reduce your dosage at bedtime if you awaken during the night to go to the bathroom.   To reduce the need for nighttime bathroom trips:   Avoid drinking fluids for several hours before going to bed   Empty your bladder before going to bed   Men can keep a urinal at the bedside   © 0086-0084 [employer], [address] [place] [address], [place] [address]. All rights  reserved. This information is not intended as a substitute for professional medical care. Always follow your healthcare professional's instructions.           Your Current Medications Are     acetaminophen (TYLENOL) 650 MG TbSR    albuterol 90 mcg/actuation inhaler    amoxicillin-clavulanate 875-125mg (AUGMENTIN) 875-125 mg per tablet    atorvastatin (LIPITOR) 20 MG tablet    carvedilol (COREG) 3.125 MG tablet    dexamethasone (DECADRON) 4 MG Tab    dutasteride (AVODART) 0.5 mg capsule    esomeprazole (NEXIUM) 40 MG capsule    folic acid (FOLVITE) 1 MG tablet    guaifenesin (MUCINEX) 600 mg 12 hr tablet    Lactobacillus rhamnosus GG (CULTURELLE) 10 billion cell capsule    nitroGLYCERIN (NITROSTAT) 0.3 MG SL tablet    ondansetron (ZOFRAN) 8 MG tablet    tamsulosin (FLOMAX) 0.4 mg Cp24    zolpidem (AMBIEN) 5 MG Tab      Facility-Administered Medications     cyanocobalamin injection 1,000 mcg      Appointments for Next Year     2/22/2017  2:00 PM ESTABLISHED PATIENT (20 min.) [facility] Hemotology Oncology Emeterio Nelson MD    Arrive at check-in approximately 15 minutes before your scheduled appointment time. Bring all outside medical records and imaging, along with a list of your current medications and insurance card.    (off [facility]) 3rd Floor    2/22/2017  2:30 PM INFUSION 060 MIN (60 min.) [facility] CHA[facility]    Arrive at check-in approximately 15 minutes before your scheduled appointment time. Bring all outside medical records and imaging, along with a list of your current medications and insurance card.    3/3/2017 10:40 AM ESTABLISHED PATIENT (20 min.) [facility] - Internal Medicine Siddharth Kaba MD    Arrive at check-in approximately 15 minutes before your scheduled appointment time. Bring all outside medical records and imaging, along with a list of your current medications and insurance card.    (off [facility]) 1st floor    4/5/2017  8:20 AM FASTING LAB (5 min.)  [facility] LABORATORY, [facility]    1. Do not eat or drink anything for TEN HOURS (10) PRIOR TO TEST. Do not chew gum or eat candy mints, even those claiming to be sugar free. Water is allowed but do not drink any other fluids 2. Take your regular daily medicines as your doctor has ordered. If you are diabetic, do not take your insulin or other diabetic medication until your blood is drawn and you are ready to eat. Your physician may have special instructions for diabetics. Check with your doctor if you have any questions.3. Alcoholic beverages are not allowed starting at 6:00pm the evening before your appointment.    (off [facility]) 2nd floor    4/24/2017 10:20 AM ESTABLISHED PATIENT (20 min.) [facility] Ace Kelly MD    Arrive at check-in approximately 15 minutes before your scheduled appointment time. Bring all outside medical records and imaging, along with a list of your current medications and insurance card.    (off [facility]) 2nd floor         Default Flowsheet Data (last 24 hours)      Amb Complex Vitals Ralph        02/15/17 1300 02/15/17 1200             Measurements    Weight  57.5 kg (126 lb 12.2 oz)       Height  6' (1.829 m)       BSA (Calculated - sq m)  1.71 sq meters       BMI (Calculated)  17.2       BP  110/70       Temp  97.5 °F (36.4 °C)       Pulse  93       Resp  18       SpO2  98 %       Pain Assessment    Pain Score Zero Zero               Allergies     Codeine Swelling    Novocain  [Procaine]     Other reaction(s): Dizziness      Medications You Received from 02/14/2017 1305 to 02/15/2017 1305        Date/Time Order Dose Route Action     02/15/2017 1302 cyanocobalamin injection 1,000 mcg 1,000 mcg Intramuscular Given      Current Discharge Medication List     Cannot display discharge medications since this is not an admission.

## 2017-02-15 NOTE — PROGRESS NOTES
Hematology/Oncology Established Visit    Reason for Consult: Management of renal mass    Consult requested by: Dr. Payton, Primary care    [facility] Diagnosis: Transitional cell carcinoma    Stage: IV    Pathology:   Left kidney biopsy 12/2015 in [place].  High grade (poorly differentiated) carcinoma, favor urothelial phenotype.  Note: Dr. Hollie Acevedo in Pathology stated that urothelial type favored rather than RCC given positivity for MURRAY-3 and p63 and negativity for PAX-8.    Prior Treatment:   1. Weekly Carboplatin-Gemcitabine, starting 3/17/16 (given on days 1, 8, 15 of 28-day cycle). Discontinued 9/7/16 due to disease progression.  2. Atezolizumab 1200mg IV every 3 weeks, approx 9/2016 - 1/2017, discontinued due to disease progression.    Current Treatment:     History of Present Illness:  Mr. Lake is an 89 y/o male with HTN, CAD, h/o MALT lymphoma s/p radiation who comes in for evaluation of metastatic renal cell carcinoma. He was initially evaluated by Dr. Bonds in [facility] at the [place]. Given drenching night sweats and weight loss, patient underwent PET scan, which was notable for a large hypermetabolic left renal mass as well as hypermetabolic retroperitoneal LNs and adrenal gland on right. Potential treatment with nephrectomy followed by systemic therapy was discussed with the patient as well as hospice. Patient opted to think about this for a while and obtain a 2nd opinion. He saw. Dr. Aiden Hendrickson in [place] for the 2nd opinion. He underwent a  CT-guided biopsy of the kidney mass, was told it was positive for renal cell carcinoma and not transitional cell carcinoma. He was told he could start on potential oral agents for treatment. The patient and his wife were in the process of moving into an assisted living facility. Then they both were admitted for pneumonia. The patient was discharged from the hospital in early February, has been admitted [facility], and  now comes in to discuss potentially starting some sort of treatment. 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 He did all the grocery shopping and cooking. He was up on his feet more than 50% of the day. He has slowed down after the recent PNA. However, he has a good appetite and states he is gaining strength. He does endorse fatigue, BROWNING, but no CP. Outside records reviewed: Urine cytology negative. Cystoscopy with biopsy negative per patient. CT-guided biopsy consistent with transitional cell carcinoma.    Patient comes in for follow up of transitional cell carcinoma. Unfortunately, his most recent imaging demonstrated disease progression. As treatment with Opdivo did not get approved by his insurance company despite multiple [id number] review discussions, patient comes in today to discuss alternate options. His chronic medical problems include BPH which is controlled on Flomax and Pulmonary fibrosis which is stable with use of Albuterol inhaler prn. He is taking stool softeners as needed for constipation. His chronic medical problems include HTN, CAD, managed by primary care. The patient also complains of fatigue and productive cough which had initially improved with past antibiotics for PNA, but have now returned.    ROS:  General:  No wt loss, fever/chills, night sweats +fatigue  Eyes: No vision problems, pain or inflammation.   Ears/Nose: No difficultly hearing, ear pain, rhinorrhea, or epistaxis  Oropharynx: No ulcers, dysphagia, or odynophagia  Cardiovascular: No chest pains, sob, PND. +dyspnea on exertion  Pulmonary: No cough, sob, hemoptysis  Gastrointestional: No n/v/d, melena, hematochezia, or change in bowel habits +poor appetite  : No dysuria, hematuria, pelvic pain or flank pain  Musculoskeletal: No myalgias, weakness, or arthralgias  Neurological: No headaches, focal deficits, or seizure activity  Endocrine: No heat or cold  intolerance   Skin: No rashes or lesions +pruritus  Psychiatric: No symptoms of mood disorders  Heme/Lymph: No lymph node enlargment    Past Medical History   Diagnosis Date    Anemia     AR (allergic rhinitis)     Arthritis     Atrial fibrillation      in 2010 after pneumonia    BPH (benign prostatic hyperplasia)     BPH with urinary obstruction     CAD (coronary artery disease)     Cancer      MALT lymphoma//radiation    Cancer of lymphatic and hematopoietic tissue 2/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 Former smoker, quit in 1970 after 30 pk-yrs. 10 glasses of wine/week or 1-2 glasses per day. Retired from prior work in engineering. Lives in an assisted living facility now.    Family History: family history includes Cancer in his sister; Heart disease in his brother and mother; Stroke in his brother. There is no history of Amblyopia, Blindness, Diabetes, Glaucoma, Macular degeneration, Retinal detachment, Strabismus, Thyroid disease, Hypertension, or Cataracts. Sister had DLBCL.    Physical Exam:  Vitals:    02/15/17 1200   BP: 110/70   Pulse: 93   Resp: 18   Temp: 97.5 °F (36.4 °C)     Body mass index is 17.19 kg/(m^2).   ECOG PS: 2  General:  AAOx4, pleasant thin elderly male in no  acute distress, accompanied by his niece.   HEENT: EOMI. Normocephalic and atraumatic. No maxillary sinus tenderness. External auditory canals clear and TMs intact without lesions. Nasal and oral mucosal membranes moist. Normal dentition and gums.   Neck: no LAD, thyromegaly, normal ROM  Pulmonary: Fine crackles in bilateral lower lung fields, otherwise clear to auscultation, Normal effort with no accessory muscle use, no wheezes/rales/rhonchi  CV: Normal rate, regular rhythm, no murmurs/rubs/gallops, no edema  ABD:  Soft, nontender, nondistended, no mass, and without hepatosplenomegaly   Ext: No clubbing, cyanosis, normal ROM. 2+ pitting edema in BLE  Skin: No rashes, lesions, bruising or petechiae  Neurological: No focal deficits, CN II to XII grossly intact, normal coordination    Psychiatric:  Normal mood, affect and judgement  Hem/Lymph:  No submandibular, cervical, supraclavicular, axillary LAD.    Labs:    Lab Results   Component Value Date    WBC 8.38 02/15/2017    HGB 10.9 (L) 02/15/2017    HCT 35.3 (L) 02/15/2017    MCV 91 02/15/2017     (H) 02/15/2017     Lab Results   Component Value Date     01/24/2017    K 4.2 01/24/2017     01/24/2017    CO2 18 (L) 01/24/2017    BUN 33 (H) 01/24/2017    CREATININE 1.5 (H) 01/24/2017    CALCIUM 9.8 01/24/2017    ANIONGAP 15 01/24/2017    ESTGFRAFRICA 47 (A) 01/24/2017    EGFRNONAA 41 (A) 01/24/2017     Lab Results   Component Value Date    ALT 18 01/24/2017    AST 19 01/24/2017    ALKPHOS 77 01/24/2017    BILITOT 0.3 01/24/2017       Lab Results   Component Value Date    IRON 16 (L) 02/25/2016    TIBC 271 02/25/2016    FERRITIN 1435 (H) 04/21/2016     Lab Results   Component Value Date    BNCIYLBG81 594 07/14/2016     Lab Results   Component Value Date    FOLATE 15.6 07/14/2016     Lab Results   Component Value Date    TSH 0.431 12/13/2016       Imaging:  PET/CT 10/6/2015:   Large hypermetabolic left renal mass highly suspicious for renal cell  carcinoma. There also findings suggestive of metastatic disease to mediastinal lymph nodes, a right para-aortic retroperitoneal lymph node, and the right adrenal gland.    CXR 2/16/16:  Interval improved inspiratory result. Overall appearance appears to have returned to baseline. Chronic interstitial disease with focal areas of fibrosis again noted, more pronounced on the RIGHT. No dense consolidation or definite effusion. Osteopenia and spondylosis. Narrowing at the cardia mediastinal contour and normal appearance of the hilar soft tissues. Trachea is midline.     Chest CT and urogram 3/9/16:  1. Diffusely abnormal appearance of the left kidney which measures approximately 13.6 x 9 cm in diameter and appears nonfunctioning. Most of the kidney appears to be occupied by a neoplastic process.  2. Complex cyst upper pole right kidney containing mural calcification.  3. Arteriosclerotic disease.  4. Old granulomatous disease.  5. Interstitial disease in both lungs with calcified pleural plaques favoring asbestos exposure.  6. Groundglass nodules in the right lung measuring up to 8.2 mm. Per Fleischer Society guidelines for nodule >8mm; in a low or high risk patient, recommend 3, 9, and 24 month CT chest follow-up to exclude neoplasia. PET scan and/or biopsy may also be considered. Metastatic disease not excluded.  7. Retroperitoneal adenopathy.   8. Enlargement of the right adrenal gland. Metastatic disease not excluded.    Chest/abd/pelvis CT 5/5/16:  1.  Left renal mass appears overall stable in size.  2.  Stable appearance to mildly enlarged right adrenal gland.  3.  Interval decrease in size of index right periaortic node.  4.  Other chronic findings as discussed above appear overall stable.    Chest/abd/pelvis CT 97/16:  Detrimental interval change with regard to enlarging right hilar and retroperitoneal lymphadenopathy.  Little interval change in large left renal mass.    Chest/abd/pelvis CT 11/9/16:  1.  Slight  interval worsening with enlarging confluent right hilar adenopathy and minimal increase in size of primary left renal lesion.  2.  Unchanged retroperitoneal adenopathy..  3.  Other stable chronic findings as above.    Chest/abd/pelvis CT 1/19/17:  1.  Continued detrimental interval change with increase in size of large primary left renal mass and conglomerate right hilar lymph node mass.  2.  Newly developed right greater left pleural effusions and new infiltrate right upper lobe.  Clinically exclude pneumonia.  3.  Remainder as above.    Assessment / Plan:  Leeroy Lake is a 89 y.o. male with HTN, CAD, prior h/o MALT lymphoma comes in for evaluation of metastatic renal cell carcinoma.    1. Metastatic transitional cell carcinoma: Although history provided by patient was suggestive of metastatic renal cell carcinoma, review of pathology report and discussion with pathologist who read the biopsy is more consistent with transitional cell (urothelial carcinoma), likely arising from the left renal pelvis. Given metastatic lympadenopathy on PET, specifically the left periaortic LN of 2.5 x 2.2cm with SUV 16.9, feel this is clearly metastatic disease. There is no indication for nephrectomy in the setting of metastatic transitional cell carcinoma. Pt was treated with weekly Carboplatin + Gemcitabine. Then, due to disease progression, he was switched to Atezolizumab.   -- Need to consent patient for Pemetrexed 500mg to be given every 3 weeks, starting next week  -- Gave pt instructions regarding Dexamethasone 4mg bid x 3 days, starting the day before chemotherapy.  -- Vitamin B12 1000mcg IM injection given today and every 3 months  -- Start folic acid 1mg PO daily    2. R upper lobe PNA: s/p Moxifloxacin and some improvement. No symptoms of cough and fatigue returning with abnormal CXR.  -- Augmentin 875/125 bid x 10 days.    3.  Anemia of chronic disease (AOCD): Iron profile c/w of AOCD. Fluctuating and likely due  to anemia of chronic disease, cancer and chemotherapy. S/p Injectafer x 2. VitB12, folate normal.    4. Pulmonary fibrosis: Stable. Seen on recent CXR. Unclear etiology and may be related to prior work exposure or may be idiopathic. Uses Albuterol inhaler prn.    5. Weight loss/low appetite: Likely due to underlying malignancy. Have tried Prednisone in the past with some improvement.    Ankita Edge M.D.  Hematology Oncology    Greater than 40 minutes spent discussing imaging results, progression of disease and treatment plan, diagnostic tests.

## 2017-02-15 NOTE — MR AVS SNAPSHOT
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         Pneumonia of right upper lobe due to infectious organism         Insomnia due to medical condition                To Do List           Future Appointments        Provider Department Dept Phone    2017 2:00 PM Emeterio Nelson MD [facility] [phone]    2017 2:30 PM CHAIR 01 [facility] [phone]    3/3/2017 10:40 AM Siddharth Kaba MD [facility] Internal Medicine [phone]    2017 8:20 AM LABORATORY, [facility] [phone]    2017 10:20 AM Ace Kelly MD [facility] [phone]      Goals (5 Years of Data)     None      Follow-Up and Disposition     Return in about 1 week (around 2017).       These Medications        Disp Refills Start End    amoxicillin-clavulanate 875-125mg (AUGMENTIN) 875-125 mg per tablet 20 tablet 0 2/15/2017 2017    Take 1 tablet by mouth 2 (two) times daily. - Oral    Pharmacy: [facility] [address] [place] [address] [place] - [address] [facility] [address] [facility] Ph #: [phone]       folic acid (FOLVITE) 1 MG tablet 100 tablet 3 2/15/2017 2/15/2018    Take 1 tablet (1 mg total) by mouth once daily. - Oral    Pharmacy: [facility] [address] [place] [address] [place] - [address] [facility] [address] [facility] Ph #: [phone]       zolpidem (AMBIEN) 5 MG Tab 10 tablet 0 2/15/2017 2017    Take 1 tablet (5 mg total) by mouth nightly as needed. - Oral    Pharmacy: [facility] [address] [address] [place] - [address] [facility] #: [phone]       dexamethasone (DECADRON) 4 MG Tab 50 tablet 1 2/15/2017 2/15/2018    Take 1 tablet (4 mg total) by mouth 2 (two) times daily. Take 1 tablet twice a day for 3 days, starting the day BEFORE chemotherapy - Oral    Pharmacy: [facility] [address] [place] [address] [place] - [address] [facility] Ph #: [phone]         [facility] On [facility]     [facility] On Call Nurse Care Line - 24/7 Assistance  Registered nurses in the [facility] On Call Center provide clinical advisement, health education, appointment booking, and other advisory services.  Call for this free service at [phone].             Medications           Message regarding Medications     Verify the changes and/or additions to your medication regime listed below are the same as discussed with your clinician today.  If any of these changes or additions are incorrect, please notify your healthcare provider.        START taking these NEW medications        Refills    amoxicillin-clavulanate 875-125mg (AUGMENTIN) 875-125 mg per tablet 0    Sig: Take 1 tablet by mouth 2 (two) times daily.    Class: Normal    Route: Oral    folic acid (FOLVITE) 1 MG tablet 3    Sig: Take 1 tablet (1 mg total) by mouth once daily.    Class: Normal    Route: Oral    zolpidem (AMBIEN) 5 MG Tab 0    Sig: Take 1 tablet (5 mg total) by mouth nightly as needed.    Class: Print    Route: Oral    dexamethasone (DECADRON) 4 MG Tab 1    Sig: Take 1 tablet (4 mg total) by mouth 2 (two) times daily. Take 1 tablet twice a day for 3 days, starting the day BEFORE chemotherapy    Class: Normal    Route: Oral           Verify that the below list of medications is an accurate representation of the medications you are currently taking.  If none reported, the list may be blank. If incorrect, please contact your healthcare provider. Carry this list with you in case of emergency.           Current  Medications     acetaminophen (TYLENOL) 650 MG TbSR Take 650 mg by mouth every 8 (eight) hours as needed.     albuterol 90 mcg/actuation inhaler Inhale 2 puffs into the lungs every 6 (six) hours as needed for Wheezing or Shortness of Breath (Or Cough).    amoxicillin-clavulanate 875-125mg (AUGMENTIN) 875-125 mg per tablet Take 1 tablet by mouth 2 (two) times daily.    atorvastatin (LIPITOR) 20 MG tablet Take 1 tablet (20 mg total) by mouth once daily.    carvedilol (COREG) 3.125 MG tablet TAKE ONE TABLET BY MOUTH TWICE DAILY    dexamethasone (DECADRON) 4 MG Tab Take 1 tablet (4 mg total) by mouth 2 (two) times daily. Take 1 tablet twice a day for 3 days, starting the day BEFORE chemotherapy    dutasteride (AVODART) 0.5 mg capsule TAKE 1 CAPSULE(0.5 MG) BY MOUTH EVERY DAY    esomeprazole (NEXIUM) 40 MG capsule TAKE ONE CAPSULE BY MOUTH DAILY    folic acid (FOLVITE) 1 MG tablet Take 1 tablet (1 mg total) by mouth once daily.    guaifenesin (MUCINEX) 600 mg 12 hr tablet Take 1 tablet (600 mg total) by mouth 2 (two) times daily.    Lactobacillus rhamnosus GG (CULTURELLE) 10 billion cell capsule Take 1 capsule by mouth once daily.    nitroGLYCERIN (NITROSTAT) 0.3 MG SL tablet As directed PRN    ondansetron (ZOFRAN) 8 MG tablet Take by mouth every 12 (twelve) hours as needed for Nausea.    tamsulosin (FLOMAX) 0.4 mg Cp24 TAKE 1 CAPSULE(0.4 MG) BY MOUTH EVERY DAY    zolpidem (AMBIEN) 5 MG Tab Take 1 tablet (5 mg total) by mouth nightly as needed.           Clinical Reference Information           Your Vitals Were     BP Pulse Temp Resp Height Weight    110/70 (BP Location: Left arm, Patient Position: Sitting, BP Method: Manual) 93 97.5 °F (36.4 °C) (Oral) 18 6' (1.829 m) 57.5 kg (126 lb 12.2 oz)    SpO2 BMI             98% 17.19 kg/m2         Blood Pressure          Most Recent Value    BP  110/70      Allergies as of 2/15/2017     Codeine    Novocain  [Procaine]      Immunizations Administered on Date of Encounter -  2/15/2017     None      Instructions    Chemotherapy instructions:  1. Start taking folic acid 1mg daily  2. The morning before chemotherapy, start taking Dexamethasone 4mg twice a day and continue this for 3 days (so you will take this medication twice a day on Tuesday, Wednesday, and Thursday on the weeks that you get chemotherapy).       Language Assistance Services     ATTENTION: Language assistance services are available, free of charge. Please call [phone].      ATENCIÓN: Si habla tony, tiene a alvares disposición servicios gratuitos de asistencia lingüística. Llame al [phone].     [facility] Ý: N?u b?n nói Ti?ng Vi?t, có các d?ch v? h? tr? ngôn ng? mi?n phí dành cho b?n. G?i s? [phone].         [facility] complies with applicable Federal civil rights laws and does not discriminate on the basis of race, color, national origin, age, disability, or sex.

## 2017-02-16 ENCOUNTER — TELEPHONE (OUTPATIENT)
Dept: HEMATOLOGY/ONCOLOGY | Facility: CLINIC | Age: 82
End: 2017-02-16

## 2017-02-16 NOTE — TELEPHONE ENCOUNTER
----- Message from Ankita Edge MD sent at 2/15/2017  5:03 PM CST -----  Yes, that is fine.    ----- Message -----     From: Fernanda Adams LPN     Sent: 2/15/2017   3:03 PM       To: Ankita Edge MD    Patient needs a filling. Is it okay if he gets one?    There shouldn't be any drilling, or such.

## 2017-02-20 ENCOUNTER — TELEPHONE (OUTPATIENT)
Dept: HEMATOLOGY/ONCOLOGY | Facility: CLINIC | Age: 82
End: 2017-02-20

## 2017-02-20 NOTE — TELEPHONE ENCOUNTER
----- Message from Ankita Edge MD sent at 2/20/2017 12:52 PM CST -----  Regarding: FW: dexamethasone premedication reminder  Can you check whether his chemo (Pemetrexed) got approved for Wednesday? And if so, please call and remind the patient he needs to start taking Dexamethasone twice a day for 3 days, starting tomorrow morning.    Thanks,  RT      ----- Message -----     From: Ankita Edge MD     Sent: 2/15/2017   4:58 PM       To: Ankita Edge MD  Subject: dexamethasone premedication reminder             Remind patient to start taking Dexamethasone twice a day starting the day before chemotherapy!    Check if Pemetrexed got approved!

## 2017-02-20 NOTE — PROGRESS NOTES
Subjective:   Patient ID: Leeroy Lake is a 89 y.o. male.  Chief Complaint:  Shortness of Breath (fluid in lungs )    HPI Comments: Patient presents with increased left persistent shortness of breath.  No fever or chills.  Medical history interstitial lung disease/pulmonary fibrosis and metastatic  cancer.  Recent x-ray with bilateral pleural effusions and possible pneumonia.  Started on Avelox by oncology.    No better no worse.  Overall same.    No new changes, concerns, or associated symptoms.     Review of Systems   Constitutional: Positive for fatigue. Negative for chills and fever.   HENT: Negative for ear pain and sore throat.    Respiratory: Positive for cough and shortness of breath. Negative for wheezing.    Cardiovascular: Negative for chest pain and palpitations.   Gastrointestinal: Negative for abdominal pain and blood in stool.   Genitourinary: Negative for dysuria and hematuria.   Musculoskeletal: Negative for neck stiffness.   Skin: Negative for rash.   Neurological: Positive for weakness. Negative for dizziness, seizures, syncope, facial asymmetry, speech difficulty, numbness and headaches.     Objective:     Visit Vitals    /70    Pulse 60    Temp 97.4 °F (36.3 °C) (Tympanic)    Ht 6' (1.829 m)    Wt 61.2 kg (134 lb 14.7 oz)    SpO2 99%    BMI 18.3 kg/m2     Physical Exam   Constitutional: He appears well-developed and well-nourished.  Non-toxic appearance. He does not appear ill. No distress.   HENT:   Right Ear: Hearing, tympanic membrane, external ear and ear canal normal.   Left Ear: Hearing, tympanic membrane, external ear and ear canal normal.   Nose: Nose normal. Right sinus exhibits no maxillary sinus tenderness and no frontal sinus tenderness. Left sinus exhibits no maxillary sinus tenderness and no frontal sinus tenderness.   Mouth/Throat: Uvula is midline, oropharynx is clear and moist and mucous membranes are normal.   Eyes: Conjunctivae are normal.    Cardiovascular: Normal rate, regular rhythm and normal heart sounds.    Pulmonary/Chest: Effort normal. No respiratory distress. He has decreased breath sounds in the right lower field and the left lower field. He has wheezes in the right lower field and the left lower field. He has rhonchi in the right middle field and the right lower field. He has no rales.   Abdominal: Soft. He exhibits no distension. There is no tenderness.   Lymphadenopathy:     He has no cervical adenopathy.   Skin: No rash noted.   Nursing note and vitals reviewed.    Assessment:     1. CAP (community acquired pneumonia)    2. Pleural effusion, bacterial      Plan:   CAP (community acquired pneumonia)  -     albuterol 90 mcg/actuation inhaler; Inhale 2 puffs into the lungs every 6 (six) hours as needed for Wheezing or Shortness of Breath (Or Cough).  Dispense: 1 Inhaler; Refill: 0    Pleural effusion, bacterial  -     X-Ray Chest PA And Lateral; Future; Expected date: 1/30/17    There is a left chest therapy port in place with the tip of its catheter noted in the expected position of the SVC.  There are chronic reticulonodular densities present within the chest which showed a mid and lower lung zone predominance suggesting chronic interstitial lung disease.  There is a large right perihilar airspace opacity present which could relate to right hilar lymphadenopathy and/or a right hilar mass, more pronounced than it did on the prior study.  There is patchy segmental air space opacity present within the right middle lobe and within the right lower lobe on the lateral radiograph.  Differential considerations include worsening interstitial lung disease, multifocal pneumonia, aspiration, or a combination of these and please.  No midline or pleural fluid.  No definite pneumothorax.  The bones are osteopenic.  There is degenerative change of the thoracic line.    No worsening of pleural effusions.  Chronic changes and worsening of pneumonia and/or  perihilar lymphadenopathy.  Finish antibiotic.  Add albuterol inhaler.  Follow-up with Dr. Payton 4 weeks.  If worsening or persistent short of breath despite albuterol, will need to see pulmonology.

## 2017-02-22 ENCOUNTER — OFFICE VISIT (OUTPATIENT)
Dept: HEMATOLOGY/ONCOLOGY | Facility: CLINIC | Age: 82
End: 2017-02-22
Payer: MEDICARE

## 2017-02-22 ENCOUNTER — OFFICE VISIT (OUTPATIENT)
Dept: INTERNAL MEDICINE | Facility: CLINIC | Age: 82
End: 2017-02-22
Payer: MEDICARE

## 2017-02-22 ENCOUNTER — HOSPITAL ENCOUNTER (OUTPATIENT)
Dept: RADIOLOGY | Facility: HOSPITAL | Age: 82
Discharge: HOME | End: 2017-02-22
Attending: INTERNAL MEDICINE
Payer: MEDICARE

## 2017-02-22 VITALS
SYSTOLIC BLOOD PRESSURE: 112 MMHG | BODY MASS INDEX: 17.61 KG/M2 | TEMPERATURE: 96 F | WEIGHT: 130.06 LBS | HEART RATE: 68 BPM | OXYGEN SATURATION: 96 % | HEIGHT: 72 IN | DIASTOLIC BLOOD PRESSURE: 70 MMHG

## 2017-02-22 VITALS
TEMPERATURE: 96 F | OXYGEN SATURATION: 96 % | DIASTOLIC BLOOD PRESSURE: 70 MMHG | BODY MASS INDEX: 17.61 KG/M2 | WEIGHT: 130.06 LBS | HEIGHT: 72 IN | SYSTOLIC BLOOD PRESSURE: 112 MMHG | RESPIRATION RATE: 18 BRPM | HEART RATE: 68 BPM

## 2017-02-22 DIAGNOSIS — C68.9 UROTHELIAL CARCINOMA: Primary | ICD-10-CM

## 2017-02-22 DIAGNOSIS — J18.9 CAP (COMMUNITY ACQUIRED PNEUMONIA): ICD-10-CM

## 2017-02-22 DIAGNOSIS — C77.8 MALIGNANT NEOPLASM METASTATIC TO LYMPH NODES OF MULTIPLE SITES: ICD-10-CM

## 2017-02-22 DIAGNOSIS — J18.9 PNEUMONIA DUE TO INFECTIOUS ORGANISM, UNSPECIFIED LATERALITY, UNSPECIFIED PART OF LUNG: Primary | ICD-10-CM

## 2017-02-22 DIAGNOSIS — R53.1 WEAKNESS GENERALIZED: ICD-10-CM

## 2017-02-22 DIAGNOSIS — J18.9 PNEUMONIA DUE TO INFECTIOUS ORGANISM, UNSPECIFIED LATERALITY, UNSPECIFIED PART OF LUNG: ICD-10-CM

## 2017-02-22 PROCEDURE — 1126F AMNT PAIN NOTED NONE PRSNT: CPT | Mod: S$GLB,,, | Performed by: INTERNAL MEDICINE

## 2017-02-22 PROCEDURE — 99499 UNLISTED E&M SERVICE: CPT | Mod: S$GLB,,, | Performed by: INTERNAL MEDICINE

## 2017-02-22 PROCEDURE — 1159F MED LIST DOCD IN RCRD: CPT | Mod: S$GLB,,, | Performed by: INTERNAL MEDICINE

## 2017-02-22 PROCEDURE — 1157F ADVNC CARE PLAN IN RCRD: CPT | Mod: S$GLB,,, | Performed by: INTERNAL MEDICINE

## 2017-02-22 PROCEDURE — 99999 PR PBB SHADOW E&M-EST. PATIENT-LVL IV: CPT | Mod: PBBFAC,,, | Performed by: INTERNAL MEDICINE

## 2017-02-22 PROCEDURE — 99213 OFFICE O/P EST LOW 20 MIN: CPT | Mod: S$GLB,,, | Performed by: INTERNAL MEDICINE

## 2017-02-22 PROCEDURE — 99214 OFFICE O/P EST MOD 30 MIN: CPT | Mod: S$GLB,,, | Performed by: INTERNAL MEDICINE

## 2017-02-22 PROCEDURE — 1160F RVW MEDS BY RX/DR IN RCRD: CPT | Mod: S$GLB,,, | Performed by: INTERNAL MEDICINE

## 2017-02-22 PROCEDURE — 71020 XR CHEST PA AND LATERAL: CPT | Mod: 26,,, | Performed by: RADIOLOGY

## 2017-02-22 RX ORDER — ALBUTEROL SULFATE 90 UG/1
2 AEROSOL, METERED RESPIRATORY (INHALATION) EVERY 6 HOURS PRN
Qty: 1 INHALER | Refills: 1 | Status: SHIPPED | OUTPATIENT
Start: 2017-02-22 | End: 2017-03-01 | Stop reason: SDUPTHER

## 2017-02-22 RX ORDER — CARVEDILOL 3.12 MG/1
TABLET ORAL
Qty: 180 TABLET | Refills: 0 | Status: SHIPPED | OUTPATIENT
Start: 2017-02-22 | End: 2017-03-01 | Stop reason: SDUPTHER

## 2017-02-22 NOTE — PROGRESS NOTES
Subjective:      Patient ID: Leeroy Lake is a 89 y.o. male.    Chief Complaint: Cough and Weakness    HPI Comments: 88 yo with Patient Active Problem List:     BPH with urinary obstruction     Hypertension     CAD (coronary artery disease)     AR (allergic rhinitis)     Gastroesophageal reflux disease     Macular drusen - Both Eyes     Post corneal transplant - Both Eyes     History of SCC (squamous cell carcinoma) of skin     Hypertensive heart disease with heart failure     Malnutrition of moderate degree     History of melanoma     Paroxysmal tachycardia     Coronary artery disease involving native coronary artery of native heart without angina pectoris     Nonrheumatic aortic valve insufficiency     Transitional cell carcinoma of left kidney     Iron deficiency anemia     Urothelial carcinoma     Dyspnea and respiratory abnormalities     Obstructive sleep apnea treated with BiPAP     Lung nodule     Interstitial lung disease     Chronic kidney disease, stage III (moderate)     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  He was originally started on Avelox January 24 for pneumonia.  He was seen by primary care provider on jan 30th with chest x-ray indicating worsening.  He was advised to complete his Avelox and be reevaluated at a later date.  Ports that his symptoms mildly improved but then seemed to worsen again.  He was seen by his oncololgist on February 15 who added Augmentin.  He reports that his cough has significantly lessened over the last 2 days.  Sputum production has also lessened.  His energy level is also increasing daily over the past 3-4 days.  He denies fever or night sweats.  He is planning to see his oncologist today to be evaluated for potential resuming chemotherapy.  He  reports that he continues with some improved chest congestion and requests a refill on his albuterol which seems to help his symptoms.    Review of Systems   Respiratory: Positive for cough and shortness of breath. Negative for wheezing.    Cardiovascular: Negative for chest pain and palpitations.     Objective:     Visit Vitals    /70 (BP Location: Right arm, Patient Position: Sitting)    Pulse 68    Temp 96.4 °F (35.8 °C) (Tympanic)    Ht 6' (1.829 m)    Wt 59 kg (130 lb 1.1 oz)    SpO2 96%    BMI 17.64 kg/m2       Physical Exam   Constitutional: He appears well-developed and well-nourished. No distress.   Cardiovascular: Normal rate and regular rhythm.    Pulmonary/Chest: No respiratory distress. He has no wheezes.   He has fine crackles and decreased breath sounds bilateral lower lobes.  Otherwise there is good air movement.   Musculoskeletal: He exhibits no edema.   Skin: Skin is warm and dry.   Psychiatric: He has a normal mood and affect. His behavior is normal.       Assessment:     1. Pneumonia due to infectious organism, unspecified laterality, unspecified part of lung    2. CAP (community acquired pneumonia)      Plan:   Pneumonia due to infectious organism, unspecified laterality, unspecified part of lung  -     X-Ray Chest PA And Lateral; Future; Expected date: 2/22/17    CAP (community acquired pneumonia)  -     albuterol 90 mcg/actuation inhaler; Inhale 2 puffs into the lungs every 6 (six) hours as needed for Wheezing or Shortness of Breath (Or Cough).  Dispense: 1 Inhaler; Refill: 1    keep Feed.fmonCurverider appt today.  Complete augmentin    Lab Frequency Next Occurrence   CT Chest With Contrast Once 7/16/2016   Comprehensive metabolic panel Once 4/5/2017   Lipid panel Once 4/5/2017   X-Ray Chest PA And Lateral Once 2/22/2017   Renal function panel     Renal function panel     Renal function panel     Renal function panel           Return if symptoms worsen or fail to improve.

## 2017-02-22 NOTE — MR AVS SNAPSHOT
[facility]  [address] [facility] [address] [place] [address]  Phone: [phone]  Fax: [phone]                  Leeroy Lake   2017 11:20 AM   Office Visit    Description:  Male : 4/10/1927   Provider:  Aries Payton MD   Department:  [facility]           Reason for Visit     Cough     Weakness           Diagnoses this Visit        Comments    Pneumonia due to infectious organism, unspecified laterality, unspecified part of lung    -  Primary            To Do List           Future Appointments        Provider Department Dept Phone    2017 12:15 PM [facility] XR2 [facility] [phone]    2017 2:00 PM Emeterio Nelson MD [facility]cology [phone]    2017 2:30 PM CHAIR 01 [facility] [phone]    3/3/2017 10:40 AM Siddharth Kaba MD [facility] Medicine [phone]    2017 8:20 AM LABORATORY, [facility] [phone]      Goals (5 Years of Data)     None      Follow-Up and Disposition     Return if symptoms worsen or fail to improve.      [facility]     [facility] Care [facility] - 24/7 Assistance  Registered nurses in the [facility] Center provide clinical advisement, health education, appointment booking, and other advisory services.  Call for this free service at [phone].             Medications           Message regarding Medications     Verify the changes and/or additions to your medication regime listed below are the same as discussed with your clinician today.  If any of these changes or additions are incorrect, please notify your healthcare provider.        STOP taking these medications     ondansetron (ZOFRAN) 8 MG tablet Take by mouth every 12 (twelve) hours as needed for Nausea.    zolpidem (AMBIEN) 5 MG Tab Take 1 tablet (5 mg total) by mouth nightly as needed.           Verify that the below list of medications is an  accurate representation of the medications you are currently taking.  If none reported, the list may be blank. If incorrect, please contact your healthcare provider. Carry this list with you in case of emergency.           Current Medications     acetaminophen (TYLENOL) 650 MG TbSR Take 650 mg by mouth every 8 (eight) hours as needed.     albuterol 90 mcg/actuation inhaler Inhale 2 puffs into the lungs every 6 (six) hours as needed for Wheezing or Shortness of Breath (Or Cough).    amoxicillin-clavulanate 875-125mg (AUGMENTIN) 875-125 mg per tablet Take 1 tablet by mouth 2 (two) times daily.    atorvastatin (LIPITOR) 20 MG tablet Take 1 tablet (20 mg total) by mouth once daily.    carvedilol (COREG) 3.125 MG tablet TAKE ONE TABLET BY MOUTH TWICE DAILY    dexamethasone (DECADRON) 4 MG Tab Take 1 tablet (4 mg total) by mouth 2 (two) times daily. Take 1 tablet twice a day for 3 days, starting the day BEFORE chemotherapy    dutasteride (AVODART) 0.5 mg capsule TAKE 1 CAPSULE(0.5 MG) BY MOUTH EVERY DAY    esomeprazole (NEXIUM) 40 MG capsule TAKE ONE CAPSULE BY MOUTH DAILY    folic acid (FOLVITE) 1 MG tablet Take 1 tablet (1 mg total) by mouth once daily.    guaifenesin (MUCINEX) 600 mg 12 hr tablet Take 1 tablet (600 mg total) by mouth 2 (two) times daily.    Lactobacillus rhamnosus GG (CULTURELLE) 10 billion cell capsule Take 1 capsule by mouth once daily.    nitroGLYCERIN (NITROSTAT) 0.3 MG SL tablet As directed PRN    tamsulosin (FLOMAX) 0.4 mg Cp24 TAKE 1 CAPSULE(0.4 MG) BY MOUTH EVERY DAY           Clinical Reference Information           Your Vitals Were     BP Pulse Temp Height Weight SpO2    112/70 (BP Location: Right arm, Patient Position: Sitting) 68 96.4 °F (35.8 °C) (Tympanic) 6' (1.829 m) 59 kg (130 lb 1.1 oz) 96%    BMI                17.64 kg/m2          Blood Pressure          Most Recent Value    BP  112/70      Allergies as of 2/22/2017     Codeine    Novocain  [Procaine]      Immunizations Administered  on Date of Encounter - 2/22/2017     None      Orders Placed During Today's Visit     Future Labs/Procedures Expected by Expires    X-Ray Chest PA And Lateral  2/22/2017 2/22/2018      Language Assistance Services     ATTENTION: Language assistance services are available, free of charge. Please call [phone].      ATENCIÓN: Si habla tony, tiene a alvares disposición servicios gratuitos de asistencia lingüística. Llame al [phone].     CHÚ Ý: N?u b?n nói Ti?ng Vi?t, có các d?ch v? h? tr? ngôn ng? mi?n phí dành cho b?n. G?i s? [phone].         [facility] - [facility] complies with applicable Federal civil rights laws and does not discriminate on the basis of race, color, national origin, age, disability, or sex.

## 2017-02-24 NOTE — PROGRESS NOTES
Reason for visit: Follow-up for metastatic urothelial carcinoma    HPI:   The patient is a 89-year-old  male who presents to the hematology oncology clinic today to discuss further evaluation and management recommendations for metastatic urothelial carcinoma.  The patient is followed in the outpatient hematology oncology clinic by Dr. Ankita Edge.  I have reviewed all of the patient's relevant clinical history available in the medical record and have utilized this in my evaluation and management recommendations today.  The patient is scheduled to initiate palliative chemotherapy with single agent Alimta today.  Today the patient reports that overall he he has generalized weakness and fatigue.  He continues to be on oral Augmentin for treatment of pneumonia.  He denies any chest pain or shortness of breath at rest.  He does report chronic dyspnea with exertion.  He does have pulmonary fibrosis of undetermined etiology.  He denies any melena, hematochezia, hematemesis, hemoptysis or hematuria.  He denies any nausea, vomiting or abdominal pain.  He denies any bowel or urinary complaints.  He reports decreased appetite.    PAST MEDICAL HISTORY/SURGICAL HISTORY/FAMILY HISTORY/SOCIAL HISTORY: Reviewed on nurse's notes.    ALLERGIES: Reviewed on medication card.    MEDICATIONS: [Medcard has been reviewed and/or reconciled.]    REVIEW OF SYSTEMS:   GENERAL: [No fevers, chills or sweats. Reports fatigue, weight loss and loss of appetite.]  HEENT: [No blurred vision, tinnitus, nasal discharge, sorethroat or dysphagia.]  HEART: [No chest pain, palpitations or shortness of breath.]    LUNGS: [No cough, hemoptysis or breathing problems.]  ABDOMEN: [No abdominal pain, nausea, vomiting, diarrhea, constipation or melena.]  GENITOURINARY: [No dysuria, bleeding or malodorous discharge.]  NEURO: [No headache, dizziness or vertigo.]  HEMATOLOGY: [No easy bruising, spontaneous bleeding or blood clots in the  past].  MUSCULOSKELETAL: [No arthralgias, myalgias or bone pains.]  SKIN: [No rashes or skin lesions.]  PSYCHIATRY: [No depression or anxiety.]    PHYSICAL EXAMINATION:   VS: Reviewed on nurse's notes.  APPEARANCE: The patient is an elderly and well-groomed  male who appears in no acute distress.  He was accompanied to this clinic visit by his niece.  He presented to this clinic visit in a wheelchair.  HEENT: No scleral icterus. Both external auditory canals clear. No oral ulcers, lesions. Throat clear  HEAD: No sinus tenderness.  NECK: Supple. No palpable lymphadenopathy. Thyroid non-tender, no palpable masses  CHEST: Breath sounds clear bilaterally. No rales. No rhonchi. Unlabored respirations.  CARDIOVASCULAR: Normal S1, S2. Normal rate. Regular rhythm.  ABDOMEN: Bowel sounds normal. No tenderness. No abdominal distention. No hepatomegaly. No splenomegaly.  LYMPHATIC: No palpable supraclavicular, axillary nodes  EXTREMITIES: No clubbing, cyanosis, edema  SKIN: No lesions. No petechiae. No ecchymoses. No induration or nodules  NEUROLOGIC: No focal findings. Alert & Oriented x 3. Mood appropriate to affect    LABS:   Reviewed    IMAGING:  Reviewed    IMPRESSION:  1.  Metastatic urothelial carcinoma  2.  Anemia due to chronic disease  3.  Pneumonia    PLAN:  1.  I had a detailed discussion with the patient today with regard to his current clinical situation.  The patient has taken his dexamethasone premedication.  However at this time I think that the patient's overall performance status is poor and the patient is likely at increased of treatment related complications with initiation of Alimta.  Therefore at this time I have recommended that he complete his antibiotics and I will reschedule initiation of his chemotherapy to February 27, 2017 after follow-up visit with his primary oncologist Dr. Edge.  I have discussed that she will reassess his overall performance status and his recovery from pneumonia and  will make a decision with regard to initiation of Alimta.  Also I have recommended not using Alimta if creatinine clearance less than 40 mL per minute as the drug would be contraindicated.  2.  The patient was reminded to take dexamethasone premedication before his follow-up.  He will continue on folic acid 1 mg by mouth once daily.    3.  He has been encouraged to keep up with a good by mouth intake of food and fluids.  He knows to seek immediate medical attention for any worsening of his symptoms.    Return to clinic as above.  He knows to call sooner if any new problems or questions.    I spent greater than 20 minutes face-to-face with the patient and his niece discussing and reviewing all of the above in detail with greater than 50% of this time spent in counseling.    Emeterio Nelson MD

## 2017-02-27 ENCOUNTER — OFFICE VISIT (OUTPATIENT)
Dept: HEMATOLOGY/ONCOLOGY | Facility: CLINIC | Age: 82
End: 2017-02-27
Payer: MEDICARE

## 2017-02-27 ENCOUNTER — INFUSION (OUTPATIENT)
Dept: INFUSION THERAPY | Facility: HOSPITAL | Age: 82
End: 2017-02-27
Attending: INTERNAL MEDICINE
Payer: MEDICARE

## 2017-02-27 VITALS
RESPIRATION RATE: 18 BRPM | HEIGHT: 73 IN | DIASTOLIC BLOOD PRESSURE: 78 MMHG | OXYGEN SATURATION: 97 % | WEIGHT: 126 LBS | HEART RATE: 93 BPM | BODY MASS INDEX: 16.7 KG/M2 | TEMPERATURE: 97 F | SYSTOLIC BLOOD PRESSURE: 132 MMHG

## 2017-02-27 DIAGNOSIS — N18.30 CHRONIC KIDNEY DISEASE, STAGE III (MODERATE): ICD-10-CM

## 2017-02-27 DIAGNOSIS — C77.8 MALIGNANT NEOPLASM METASTATIC TO LYMPH NODES OF MULTIPLE SITES: Primary | ICD-10-CM

## 2017-02-27 DIAGNOSIS — Z51.11 CHEMOTHERAPY MANAGEMENT, ENCOUNTER FOR: ICD-10-CM

## 2017-02-27 DIAGNOSIS — J84.10 PULMONARY FIBROSIS: ICD-10-CM

## 2017-02-27 DIAGNOSIS — C64.2 TRANSITIONAL CELL CARCINOMA OF LEFT KIDNEY: Primary | ICD-10-CM

## 2017-02-27 PROCEDURE — 96409 CHEMO IV PUSH SNGL DRUG: CPT | Mod: PO

## 2017-02-27 PROCEDURE — 99215 OFFICE O/P EST HI 40 MIN: CPT | Mod: 25,S$GLB,, | Performed by: INTERNAL MEDICINE

## 2017-02-27 PROCEDURE — 1126F AMNT PAIN NOTED NONE PRSNT: CPT | Mod: S$GLB,,, | Performed by: INTERNAL MEDICINE

## 2017-02-27 PROCEDURE — 99499 UNLISTED E&M SERVICE: CPT | Mod: S$GLB,,, | Performed by: INTERNAL MEDICINE

## 2017-02-27 PROCEDURE — 1159F MED LIST DOCD IN RCRD: CPT | Mod: S$GLB,,, | Performed by: INTERNAL MEDICINE

## 2017-02-27 PROCEDURE — 63600175 PHARM REV CODE 636 W HCPCS: Mod: JW,PO | Performed by: INTERNAL MEDICINE

## 2017-02-27 PROCEDURE — 1160F RVW MEDS BY RX/DR IN RCRD: CPT | Mod: S$GLB,,, | Performed by: INTERNAL MEDICINE

## 2017-02-27 PROCEDURE — 99999 PR PBB SHADOW E&M-EST. PATIENT-LVL IV: CPT | Mod: PBBFAC,,, | Performed by: INTERNAL MEDICINE

## 2017-02-27 PROCEDURE — 96367 TX/PROPH/DG ADDL SEQ IV INF: CPT | Mod: PO

## 2017-02-27 PROCEDURE — 25000003 PHARM REV CODE 250: Mod: PO | Performed by: INTERNAL MEDICINE

## 2017-02-27 PROCEDURE — 1157F ADVNC CARE PLAN IN RCRD: CPT | Mod: S$GLB,,, | Performed by: INTERNAL MEDICINE

## 2017-02-27 RX ORDER — HEPARIN 100 UNIT/ML
500 SYRINGE INTRAVENOUS
Status: DISCONTINUED | OUTPATIENT
Start: 2017-02-27 | End: 2017-02-27 | Stop reason: HOSPADM

## 2017-02-27 RX ORDER — HEPARIN 100 UNIT/ML
500 SYRINGE INTRAVENOUS
Status: CANCELLED | OUTPATIENT
Start: 2017-02-27

## 2017-02-27 RX ORDER — SODIUM CHLORIDE 0.9 % (FLUSH) 0.9 %
10 SYRINGE (ML) INJECTION
Status: CANCELLED | OUTPATIENT
Start: 2017-02-27

## 2017-02-27 RX ADMIN — DEXAMETHASONE SODIUM PHOSPHATE: 4 INJECTION, SOLUTION INTRA-ARTICULAR; INTRALESIONAL; INTRAMUSCULAR; INTRAVENOUS; SOFT TISSUE at 11:02

## 2017-02-27 RX ADMIN — HEPARIN 500 UNITS: 100 SYRINGE at 12:02

## 2017-02-27 RX ADMIN — SODIUM CHLORIDE 850 MG: 9 INJECTION, SOLUTION INTRAVENOUS at 11:02

## 2017-02-27 NOTE — PROGRESS NOTES
Hematology/Oncology Established Visit    Reason for Consult: Management of renal mass    Consult requested by: Dr. Payton, Primary care    [facility] Diagnosis: Transitional cell carcinoma    Stage: IV    Pathology:   Left kidney biopsy 12/2015 in [place].  High grade (poorly differentiated) carcinoma, favor urothelial phenotype.  Note: Dr. Hollie Acevedo in Pathology stated that urothelial type favored rather than RCC given positivity for MURRAY-3 and p63 and negativity for PAX-8.    Prior Treatment:   1. Weekly Carboplatin-Gemcitabine, starting 3/17/16 (given on days 1, 8, 15 of 28-day cycle). Discontinued 9/7/16 due to disease progression.  2. Atezolizumab 1200mg IV every 3 weeks, approx 9/2016 - 1/2017, discontinued due to disease progression.    Current Treatment:     History of Present Illness:  Mr. Lake is an 87 y/o male with HTN, CAD, h/o MALT lymphoma s/p radiation who comes in for evaluation of metastatic renal cell carcinoma. He was initially evaluated by Dr. Bonds in [facility] at the [place]. Given drenching night sweats and weight loss, patient underwent PET scan, which was notable for a large hypermetabolic left renal mass as well as hypermetabolic retroperitoneal LNs and adrenal gland on right. Potential treatment with nephrectomy followed by systemic therapy was discussed with the patient as well as hospice. Patient opted to think about this for a while and obtain a 2nd opinion. He saw. Dr. Aiden Hendrickson in [place] for the 2nd opinion. He underwent a  CT-guided biopsy of the kidney mass, was told it was positive for renal cell carcinoma and not transitional cell carcinoma. He was told he could start on potential oral agents for treatment. The patient and his wife were in the process of moving into an assisted living facility. Then they both were admitted for pneumonia. The patient was discharged from the hospital in early February, has been admitted to [facility] living [facility], and  now comes in to discuss potentially starting some sort of treatment. 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 He did all the grocery shopping and cooking. He was up on his feet more than 50% of the day. He has slowed down after the recent PNA. However, he has a good appetite and states he is gaining strength. He does endorse fatigue, BROWNING, but no CP. Outside records reviewed: Urine cytology negative. Cystoscopy with biopsy negative per patient. CT-guided biopsy consistent with transitional cell carcinoma.    Patient comes in for follow up of transitional cell carcinoma. Unfortunately, his most recent imaging demonstrated disease progression. As treatment with Opdivo did not get approved by his insurance company despite multiple [id number] review discussions, I have chosen to start patient on Pemetrexed. Last week, he saw Dr. Nelson to initiate treatment, but he was still quite fatigued and recovering from his pneumonia. Therefore, treatment was held. Today, he and his niece state that the patient is feeling much better. His cough is resolved and his energy level is improved. He went out to lunch over the weekend and has been more active. His chronic medical problems include BPH which is controlled on Flomax and Pulmonary fibrosis which is stable with use of Albuterol inhaler prn. He is taking stool softeners as needed for constipation. His chronic medical problems include HTN, CAD, managed by primary care.    ROS:  General:  No wt loss, fever/chills, night sweats +fatigue  Eyes: No vision problems, pain or inflammation.   Ears/Nose: No difficultly hearing, ear pain, rhinorrhea, or epistaxis  Oropharynx: No ulcers, dysphagia, or odynophagia  Cardiovascular: No chest pains, sob, PND. +dyspnea on exertion  Pulmonary: No cough, sob, hemoptysis  Gastrointestional: No n/v/d, melena, hematochezia, or change in bowel habits +poor appetite  : No  dysuria, hematuria, pelvic pain or flank pain  Musculoskeletal: No myalgias, weakness, or arthralgias  Neurological: No headaches, focal deficits, or seizure activity  Endocrine: No heat or cold intolerance   Skin: No rashes or lesions +pruritus  Psychiatric: No symptoms of mood disorders  Heme/Lymph: No lymph node enlargment    Past Medical History:   Diagnosis Date    Anemia     AR (allergic rhinitis)     Arthritis     Atrial fibrillation     in 2010 after pneumonia    BPH (benign prostatic hyperplasia)     BPH with urinary obstruction     CAD (coronary artery disease)     Cancer     MALT lymphoma//radiation    Cancer of lymphatic and hematopoietic tissue 2/13/2014    Cataract     Depression     Gastroesophageal reflux disease     Hearing loss     Hematoma complicating a procedure 11/5/2013    Hypertension     Malignant neoplasm metastatic to lymph nodes of multiple sites 10/8/2015    Obstructive sleep apnea     uses C-PAP    Open wound of shoulder region without complication 11/5/2013    PAC (premature atrial contraction) 4/16/2015 4/2015 event monitor radha PAC short runs atrial tach.     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 Former smoker, quit in 1970 after 30 pk-yrs. 10 glasses of wine/week or 1-2 glasses per day. Retired from prior work in engineering. Lives in an assisted living facility now.    Family History: family history includes Cancer in his sister; Heart disease in his brother and mother; Stroke in his brother. There is no history of Amblyopia, Blindness, Diabetes, Glaucoma, Macular degeneration, Retinal detachment, Strabismus, Thyroid disease, Hypertension, or Cataracts. Sister had DLBCL.    Physical  Exam:  Vitals:    02/27/17 1040   BP: 132/78   Pulse: 93   Resp: 18   Temp: 97 °F (36.1 °C)     Body mass index is 16.62 kg/(m^2).   ECOG PS: 2  General:  AAOx4, pleasant thin elderly male in no acute distress, accompanied by his niece.   HEENT: EOMI. Normocephalic and atraumatic. No maxillary sinus tenderness. External auditory canals clear and TMs intact without lesions. Nasal and oral mucosal membranes moist. Normal dentition and gums.   Neck: no LAD, thyromegaly, normal ROM  Pulmonary: Fine crackles in bilateral lower lung fields, otherwise clear to auscultation, Normal effort with no accessory muscle use, no wheezes/rales/rhonchi  CV: Normal rate, regular rhythm, no murmurs/rubs/gallops, no edema  ABD:  Soft, nontender, nondistended, no mass, and without hepatosplenomegaly   Ext: No clubbing, cyanosis, normal ROM. +trace pitting edema in BLE  Skin: No rashes, lesions, bruising or petechiae  Neurological: No focal deficits, CN II to XII grossly intact, normal coordination    Psychiatric:  Normal mood, affect and judgement  Hem/Lymph:  No submandibular, cervical, supraclavicular, axillary LAD.    Labs:    Lab Results   Component Value Date    WBC 11.22 02/27/2017    HGB 11.4 (L) 02/27/2017    HCT 37.4 (L) 02/27/2017    MCV 90 02/27/2017     (H) 02/27/2017     Lab Results   Component Value Date     02/15/2017    K 4.2 02/15/2017     02/15/2017    CO2 20 (L) 02/15/2017    BUN 29 (H) 02/15/2017    CREATININE 1.4 02/15/2017    CALCIUM 9.8 02/15/2017    ANIONGAP 11 02/15/2017    ESTGFRAFRICA 51 (A) 02/15/2017    EGFRNONAA 44 (A) 02/15/2017     Lab Results   Component Value Date    ALT 14 02/15/2017    AST 19 02/15/2017    ALKPHOS 89 02/15/2017    BILITOT 0.3 02/15/2017       Lab Results   Component Value Date    IRON 16 (L) 02/25/2016    TIBC 271 02/25/2016    FERRITIN 1435 (H) 04/21/2016     Lab Results   Component Value Date    MXCDLOIM40 594 07/14/2016     Lab Results   Component Value Date     FOLATE 15.6 07/14/2016     Lab Results   Component Value Date    TSH 0.431 12/13/2016       Imaging:  PET/CT 10/6/2015:   Large hypermetabolic left renal mass highly suspicious for renal cell carcinoma. There also findings suggestive of metastatic disease to mediastinal lymph nodes, a right para-aortic retroperitoneal lymph node, and the right adrenal gland.    CXR 2/16/16:  Interval improved inspiratory result. Overall appearance appears to have returned to baseline. Chronic interstitial disease with focal areas of fibrosis again noted, more pronounced on the RIGHT. No dense consolidation or definite effusion. Osteopenia and spondylosis. Narrowing at the cardia mediastinal contour and normal appearance of the hilar soft tissues. Trachea is midline.     Chest CT and urogram 3/9/16:  1. Diffusely abnormal appearance of the left kidney which measures approximately 13.6 x 9 cm in diameter and appears nonfunctioning. Most of the kidney appears to be occupied by a neoplastic process.  2. Complex cyst upper pole right kidney containing mural calcification.  3. Arteriosclerotic disease.  4. Old granulomatous disease.  5. Interstitial disease in both lungs with calcified pleural plaques favoring asbestos exposure.  6. Groundglass nodules in the right lung measuring up to 8.2 mm. Per Fleischer Society guidelines for nodule >8mm; in a low or high risk patient, recommend 3, 9, and 24 month CT chest follow-up to exclude neoplasia. PET scan and/or biopsy may also be considered. Metastatic disease not excluded.  7. Retroperitoneal adenopathy.   8. Enlargement of the right adrenal gland. Metastatic disease not excluded.    Chest/abd/pelvis CT 5/5/16:  1.  Left renal mass appears overall stable in size.  2.  Stable appearance to mildly enlarged right adrenal gland.  3.  Interval decrease in size of index right periaortic node.  4.  Other chronic findings as discussed above appear overall stable.    Chest/abd/pelvis CT  97/16:  Detrimental interval change with regard to enlarging right hilar and retroperitoneal lymphadenopathy.  Little interval change in large left renal mass.    Chest/abd/pelvis CT 11/9/16:  1.  Slight interval worsening with enlarging confluent right hilar adenopathy and minimal increase in size of primary left renal lesion.  2.  Unchanged retroperitoneal adenopathy..  3.  Other stable chronic findings as above.    Chest/abd/pelvis CT 1/19/17:  1.  Continued detrimental interval change with increase in size of large primary left renal mass and conglomerate right hilar lymph node mass.  2.  Newly developed right greater left pleural effusions and new infiltrate right upper lobe.  Clinically exclude pneumonia.  3.  Remainder as above.    Assessment / Plan:  Leeroy Lake is a 89 y.o. male with HTN, CAD, prior h/o MALT lymphoma comes in for evaluation of metastatic renal cell carcinoma.    1. Metastatic transitional cell carcinoma: Although history provided by patient was suggestive of metastatic renal cell carcinoma, review of pathology report and discussion with pathologist who read the biopsy is more consistent with transitional cell (urothelial carcinoma), likely arising from the left renal pelvis. Given metastatic lympadenopathy on PET, specifically the left periaortic LN of 2.5 x 2.2cm with SUV 16.9, feel this is clearly metastatic disease. There is no indication for nephrectomy in the setting of metastatic transitional cell carcinoma. Pt was treated with weekly Carboplatin + Gemcitabine. Then, due to disease progression, he was switched to Atezolizumab.   -- Proceed with cycle 1 of Pemetrexed 500mg today, given every 3 weeks.  -- Gave pt instructions regarding Dexamethasone 4mg bid x 3 days, starting the day before chemotherapy.  -- Return in 10 days for agustín counts and MD visit.  -- Vitamin B12 1000mcg IM injection every 3 months  -- Continue folic acid 1mg PO daily    2. R upper lobe PNA: s/p  Moxifloxacin with only partial improvement. Then treated with Augmentin 875/125 bid x 10 days. Now pt is feeling much better with no cough and improved energy level.    3.  Anemia of chronic disease (AOCD): Iron profile c/w of AOCD. Fluctuating and likely due to anemia of chronic disease, cancer and chemotherapy. S/p Injectafer x 2. VitB12, folate normal.    4. Pulmonary fibrosis: Stable. Seen on recent CXR. Unclear etiology and may be related to prior work exposure or may be idiopathic. Uses Albuterol inhaler prn.    5. Weight loss/low appetite: Likely due to underlying malignancy. Have tried Prednisone in the past with some improvement.    Ankita Edge M.D.  Hematology Oncology

## 2017-02-27 NOTE — PATIENT INSTRUCTIONS
[facility]  [address]  [address] [facility] [address]  [phone] phone     [phone] fax  Hours of Operation: Monday- Friday 8:00am- 5:00pm  After hours phone  [phone]  Hematology / Oncology Physicians on call      Dr. Jc Edge    Please call with any concerns regarding your appointment today.FALL PREVENTION   Falls often occur due to slipping, tripping or losing your balance. Here are ways to reduce your risk of falling again.   Was there anything that caused your fall that can be fixed, removed or replaced?   Make your home safe by keeping walkways clear of objects you may trip over.   Use non-slip pads under rugs.   Do not walk in poorly lit areas.   Do not stand on chairs or wobbly ladders.   Use caution when reaching overhead or looking upward. This position can cause a loss of balance.   Be sure your shoes fit properly, have non-slip bottoms and are in good condition.   Be cautious when going up and down stairs, curbs, and when walking on uneven sidewalks.   If your balance is poor, consider using a cane or walker.   If your fall was related to alcohol use, stop or limit alcohol intake.   If your fall was related to use of sleeping medicines, talk to your doctor about this. You may need to reduce your dosage at bedtime if you awaken during the night to go to the bathroom.   To reduce the need for nighttime bathroom trips:   Avoid drinking fluids for several hours before going to bed   Empty your bladder before going to bed   Men can keep a urinal at the bedside   © 0086-9194 [employer], [address] [place] [address], [place] [address]. All rights reserved. This information is not intended as a substitute for professional medical care. Always follow your healthcare professional's instructions.  HOME CARE AFTER CHEMOTHERAPY   Meals   Many patients feel sick and lose their appetites during treatment. Eat small meals several  times a day. Choose bland foods with little taste or smell if you have problems with nausea. Be sure to cook all food thoroughly. This kills bacteria and helps you avoid intestinal infection. Soft foods are easier to swallow and digest.   Activity   Exercise keeps you strong and keeps your heart and lungs active. Talk to your doctor about an appropriate exercise program for you.   Skin Care   To prevent a skin infection, bathe or shower once a day. Use a moisturizing soap and wash with warm water. Avoid very hot or cold water. Chemotherapy can make your skin dry . Apply moisturizing lotion to help relieve dry skin. Some drugs used in high doses can cause slight burns to appear (like sunburn). Ask for a special cream to help relieve the burn and protect your skin.   Prevent Mouth Sores   During chemotherapy, many people get mouth sores. Do the following to help prevent mouth sores or to ease discomfort.   Brush your teeth with a soft-bristle toothbrush after every meal.  Don't use dental floss if your platelet count is below 50,000. Your doctor or nurse will tell you if this is the case.  Use an oral swab or special soft toothbrush if your gums bleed during regular brushing.  Use mouthwash as directed. If you can't tolerate commercial mouthwash, use salt and baking soda to clean your mouth. Mix 1 teaspoon of salt and 1 teaspoon of baking soda into a glass of water. Swish and spit.  Call your doctor or return to this facility if you develop any of the following:   Sore throat   White patches in the mouth or throat   Fever of 100.4ºF (38ºC) or higher, or as directed by your healthcare provider  © 4393-2611 [employer], [address] [place] [address], [place] [address]. All rights reserved. This information is not intended as a substitute for professional medical care. Always follow your healthcare professional's   WAYS TO HELP PREVENT INFECTION         WASH YOUR HANDS OFTEN DURING THE DAY, ESPECIALLY BEFORE YOU EAT, AFTER  USING THE BATHROOM, AND AFTER TOUCHING ANIMALS     STAY AWAY FROM PEOPLE WHO HAVE ILLNESSES YOU CAN CATCH; SUCH AS COLDS, FLU, CHICKEN POX     TRY TO AVOID CROWDS     STAY AWAY FROM CHILDREN WHO RECENTLY HAVE RECEIVED LIVE VIRUS VACCINES     MAINTAIN GOOD MOUTH CARE     DO NOT SQUEEZE OR SCRATCH PIMPLES     CLEAN CUTS & SCRAPES RIGHT AWAY AND DAILY UNTIL HEALED WITH WARM WATER, SOAP & AN ANTISEPTIC     AVOID CONTACT WITH LITTER BOXES, BIRD CAGES, & FISH TANKS     AVOID STANDING WATER, IE., BIRD BATHS, FLOWER POTS/VASES, OR HUMIDIFIERS     WEAR GLOVES WHEN GARDENING OR CLEANING UP AFTER OTHERS, ESPECIALLY BABIES & SMALL CHILDREN    DO NOT EAT RAW FISH, SEAFOOD, MEAT, OR EGGS  Pemetrexed injection  What is this medicine?  PEMETREXED (PEM e TREX ed) is a chemotherapy drug. This medicine affects cells that are rapidly growing, such as cancer cells and cells in your mouth and stomach. It is usually used to treat lung cancers like non-small cell lung cancer and mesothelioma. It may also be used to treat other cancers.  How should I use this medicine?  This drug is given as an infusion into a vein. It is administered in a hospital or clinic by a specially trained health care professional.  Talk to your pediatrician regarding the use of this medicine in children. Special care may be needed.  What side effects may I notice from receiving this medicine?  Side effects that you should report to your doctor or health care professional as soon as possible:  allergic reactions like skin rash, itching or hives, swelling of the face, lips, or tongue  low blood counts - this medicine may decrease the number of white blood cells, red blood cells and platelets. You may be at increased risk for infections and bleeding.  signs of infection - fever or chills, cough, sore throat, pain or difficulty passing urine  signs of decreased platelets or bleeding - bruising, pinpoint red spots on the skin, black, tarry stools, blood in  the urine  signs of decreased red blood cells - unusually weak or tired, fainting spells, lightheadedness  breathing problems, like a dry cough  changes in emotions or moods  chest pain  confusion  diarrhea  high blood pressure  mouth or throat sores or ulcers  pain, swelling, warmth in the leg  pain on swallowing  swelling of the ankles, feet, hands  trouble passing urine or change in the amount of urine  vomiting  yellowing of the eyes or skin  Side effects that usually do not require medical attention (report to your doctor or health care professional if they continue or are bothersome):  hair loss  loss of appetite  nausea  stomach upset  What may interact with this medicine?  aspirin and aspirin-like medicines  medicines to increase blood counts like filgrastim, pegfilgrastim, sargramostim  methotrexate  NSAIDS, medicines for pain and inflammation, like ibuprofen or naproxen  probenecid  pyrimethamine  vaccines  Talk to your doctor or health care professional before taking any of these medicines:  acetaminophen  aspirin  ibuprofen  ketoprofen  naproxen  What if I miss a dose?  It is important not to miss your dose. Call your doctor or health care professional if you are unable to keep an appointment.  Where should I keep my medicine?  This drug is given in a hospital or clinic and will not be stored at home.  What should I tell my health care provider before I take this medicine?  They need to know if you have any of these conditions:  if you frequently drink alcohol containing beverages  infection (especially a virus infection such as chickenpox, cold sores, or herpes)  kidney disease  liver disease  low blood counts, like low platelets, red bloods, or white blood cells  an unusual or allergic reaction to pemetrexed, mannitol, other medicines, foods, dyes, or preservatives  pregnant or trying to get pregnant  breast-feeding  What should I watch for while using this medicine?  Visit your doctor for checks on  your progress. This drug may make you feel generally unwell. This is not uncommon, as chemotherapy can affect healthy cells as well as cancer cells. Report any side effects. Continue your course of treatment even though you feel ill unless your doctor tells you to stop.  In some cases, you may be given additional medicines to help with side effects. Follow all directions for their use.  Call your doctor or health care professional for advice if you get a fever, chills or sore throat, or other symptoms of a cold or flu. Do not treat yourself. This drug decreases your body's ability to fight infections. Try to avoid being around people who are sick.  This medicine may increase your risk to bruise or bleed. Call your doctor or health care professional if you notice any unusual bleeding.  Be careful brushing and flossing your teeth or using a toothpick because you may get an infection or bleed more easily. If you have any dental work done, tell your dentist you are receiving this medicine.  Avoid taking products that contain aspirin, acetaminophen, ibuprofen, naproxen, or ketoprofen unless instructed by your doctor. These medicines may hide a fever.  Call your doctor or health care professional if you get diarrhea or mouth sores. Do not treat yourself.  To protect your kidneys, drink water or other fluids as directed while you are taking this medicine.  Men and women must use effective birth control while taking this medicine. You may also need to continue using effective birth control for a time after stopping this medicine. Do not become pregnant while taking this medicine. Tell your doctor right away if you think that you or your partner might be pregnant. There is a potential for serious side effects to an unborn child. Talk to your health care professional or pharmacist for more information. Do not breast-feed an infant while taking this medicine. This medicine may lower sperm counts.  Date Last Reviewed:    NOTE:This sheet is a summary. It may not cover all possible information. If you have questions about this medicine, talk to your doctor, pharmacist, or health care provider. Copyright© 2016 [employer]      

## 2017-02-27 NOTE — PATIENT INSTRUCTIONS
For constipation:  Add Docusate 100mg daily as a regular bowel regimen. This is a stool softener.   If you BM in 2 days, add a laxative like Sennakot as needed.

## 2017-02-27 NOTE — MR AVS SNAPSHOT
Patient Information     Patient Name Sex Leeroy Lopez Male 4/10/1927      Visit Information        Provider Department Dept Phone Center    2017 10:00 AM Ankita dEge MD [facility] Hematology Oncology [phone] [facility]      Patient Instructions    For constipation:  Add Docusate 100mg daily as a regular bowel regimen. This is a stool softener.   If you BM in 2 days, add a laxative like Sennakot as needed.       Your Current Medications Are     acetaminophen (TYLENOL) 650 MG TbSR    albuterol 90 mcg/actuation inhaler    atorvastatin (LIPITOR) 20 MG tablet    carvedilol (COREG) 3.125 MG tablet    dexamethasone (DECADRON) 4 MG Tab    dutasteride (AVODART) 0.5 mg capsule    esomeprazole (NEXIUM) 40 MG capsule    folic acid (FOLVITE) 1 MG tablet    guaifenesin (MUCINEX) 600 mg 12 hr tablet    Lactobacillus rhamnosus GG (CULTURELLE) 10 billion cell capsule    nitroGLYCERIN (NITROSTAT) 0.3 MG SL tablet    tamsulosin (FLOMAX) 0.4 mg Cp24      Facility-Administered Medications     dexamethasone (DECADRON) 10 mg in sodium chloride 0.9% IVPB    heparin, porcine (PF) 100 unit/mL injection flush 500 Units    pemetrexed (ALIMTA) 850 mg in sodium chloride 0.9% 100 mL chemo infusion    sodium chloride 0.9% 100 mL flush bag      Appointments for Next Year     3/3/2017 10:40 AM ESTABLISHED PATIENT (20 min.) [facility] - Internal Medicine Siddharth Kaba MD    Arrive at check-in approximately 15 minutes before your scheduled appointment time. Bring all outside medical records and imaging, along with a list of your current medications and insurance card.    (off [address]) 1st floor    3/10/2017  8:30 AM NON FASTING LAB (5 min.) [facility]    Arrive at check-in approximately 15 minutes before your scheduled appointment time. Bring all outside medical records and imaging, along with a list of your current medications and insurance card.    (off [address]) 2nd floor     "3/10/2017  9:00 AM ESTABLISHED PATIENT (20 min.) [facility] - Hemotology Oncology Ankita Edge MD    Arrive at check-in approximately 15 minutes before your scheduled appointment time. Bring all outside medical records and imaging, along with a list of your current medications and insurance card.    (off [address]) 3rd Floor    4/5/2017  8:20 AM FASTING LAB (5 min.) [facility] LABORATORY, [facility]    1. Do not eat or drink anything for TEN HOURS (10) PRIOR TO TEST. Do not chew gum or eat candy mints, even those claiming to be sugar free. Water is allowed but do not drink any other fluids 2. Take your regular daily medicines as your doctor has ordered. If you are diabetic, do not take your insulin or other diabetic medication until your blood is drawn and you are ready to eat. Your physician may have special instructions for diabetics. Check with your doctor if you have any questions.3. Alcoholic beverages are not allowed starting at 6:00pm the evening before your appointment.    (off [address]) 2nd floor    4/24/2017 10:20 AM ESTABLISHED PATIENT (20 min.) [facility] - Pulmonary Services Ace Kelly MD    Arrive at check-in approximately 15 minutes before your scheduled appointment time. Bring all outside medical records and imaging, along with a list of your current medications and insurance card.    (off [address]) 2nd floor         Default Flowsheet Data (last 24 hours)      Amb Complex Vitals Ralph        02/27/17 1124 02/27/17 1040             Measurements    Weight  57.2 kg (126 lb)       Height  6' 1" (1.854 m)       BSA (Calculated - sq m)  1.72 sq meters       BMI (Calculated)  16.7       BP  132/78       Temp  97 °F (36.1 °C)       Pulse  93       Resp  18       SpO2  97 %       Pain Assessment    Pain Score Zero Zero               Allergies     Codeine Swelling    Novocain  [Procaine]     Other reaction(s): Dizziness      Current Discharge Medication List     Cannot display discharge " medications since this is not an admission.      Follow-up and Disposition     Return in about 10 days (around 3/9/2017).    Follow-up and Disposition History

## 2017-02-27 NOTE — MR AVS SNAPSHOT
Patient Information     Patient Name Sex Leeroy Lopez Male 4/10/1927      Visit Information        Provider Department Dept Phone Center    2017 10:30 AM [facility] Chemo Infusion [facility] Chemotherapy Infusion [phone] [facility]      Patient Instructions      [facility]  [address] [facility] [address]  [address]  [phone] phone     [phone] fax  Hours of Operation: Monday- Friday 8:00am- 5:00pm  After hours phone  [phone]  Hematology / Oncology Physicians on call      Dr. Jc Edge    Please call with any concerns regarding your appointment today.FALL PREVENTION   Falls often occur due to slipping, tripping or losing your balance. Here are ways to reduce your risk of falling again.   Was there anything that caused your fall that can be fixed, removed or replaced?   Make your home safe by keeping walkways clear of objects you may trip over.   Use non-slip pads under rugs.   Do not walk in poorly lit areas.   Do not stand on chairs or wobbly ladders.   Use caution when reaching overhead or looking upward. This position can cause a loss of balance.   Be sure your shoes fit properly, have non-slip bottoms and are in good condition.   Be cautious when going up and down stairs, curbs, and when walking on uneven sidewalks.   If your balance is poor, consider using a cane or walker.   If your fall was related to alcohol use, stop or limit alcohol intake.   If your fall was related to use of sleeping medicines, talk to your doctor about this. You may need to reduce your dosage at bedtime if you awaken during the night to go to the bathroom.   To reduce the need for nighttime bathroom trips:   Avoid drinking fluids for several hours before going to bed   Empty your bladder before going to bed   Men can keep a urinal at the bedside   © 4558-5218 [employer], [address] [place] [address], [place] [address]. All rights reserved.  This information is not intended as a substitute for professional medical care. Always follow your healthcare professional's instructions.  HOME CARE AFTER CHEMOTHERAPY   Meals   Many patients feel sick and lose their appetites during treatment. Eat small meals several times a day. Choose bland foods with little taste or smell if you have problems with nausea. Be sure to cook all food thoroughly. This kills bacteria and helps you avoid intestinal infection. Soft foods are easier to swallow and digest.   Activity   Exercise keeps you strong and keeps your heart and lungs active. Talk to your doctor about an appropriate exercise program for you.   Skin Care   To prevent a skin infection, bathe or shower once a day. Use a moisturizing soap and wash with warm water. Avoid very hot or cold water. Chemotherapy can make your skin dry . Apply moisturizing lotion to help relieve dry skin. Some drugs used in high doses can cause slight burns to appear (like sunburn). Ask for a special cream to help relieve the burn and protect your skin.   Prevent Mouth Sores   During chemotherapy, many people get mouth sores. Do the following to help prevent mouth sores or to ease discomfort.   Brush your teeth with a soft-bristle toothbrush after every meal.  Don't use dental floss if your platelet count is below 50,000. Your doctor or nurse will tell you if this is the case.  Use an oral swab or special soft toothbrush if your gums bleed during regular brushing.  Use mouthwash as directed. If you can't tolerate commercial mouthwash, use salt and baking soda to clean your mouth. Mix 1 teaspoon of salt and 1 teaspoon of baking soda into a glass of water. Swish and spit.  Call your doctor or return to this facility if you develop any of the following:   Sore throat   White patches in the mouth or throat   Fever of 100.4ºF (38ºC) or higher, or as directed by your healthcare provider  © 9667-7790 [employer], [address] [place] [address],  [place] [address]. All rights reserved. This information is not intended as a substitute for professional medical care. Always follow your healthcare professional's   WAYS TO HELP PREVENT INFECTION         WASH YOUR HANDS OFTEN DURING THE DAY, ESPECIALLY BEFORE YOU EAT, AFTER USING THE BATHROOM, AND AFTER TOUCHING ANIMALS     STAY AWAY FROM PEOPLE WHO HAVE ILLNESSES YOU CAN CATCH; SUCH AS COLDS, FLU, CHICKEN POX     TRY TO AVOID CROWDS     STAY AWAY FROM CHILDREN WHO RECENTLY HAVE RECEIVED LIVE VIRUS VACCINES     MAINTAIN GOOD MOUTH CARE     DO NOT SQUEEZE OR SCRATCH PIMPLES     CLEAN CUTS & SCRAPES RIGHT AWAY AND DAILY UNTIL HEALED WITH WARM WATER, SOAP & AN ANTISEPTIC     AVOID CONTACT WITH LITTER BOXES, BIRD CAGES, & FISH TANKS     AVOID STANDING WATER, IE., BIRD BATHS, FLOWER POTS/VASES, OR HUMIDIFIERS     WEAR GLOVES WHEN GARDENING OR CLEANING UP AFTER OTHERS, ESPECIALLY BABIES & SMALL CHILDREN    DO NOT EAT RAW FISH, SEAFOOD, MEAT, OR EGGS  Pemetrexed injection  What is this medicine?  PEMETREXED (PEM e TREX ed) is a chemotherapy drug. This medicine affects cells that are rapidly growing, such as cancer cells and cells in your mouth and stomach. It is usually used to treat lung cancers like non-small cell lung cancer and mesothelioma. It may also be used to treat other cancers.  How should I use this medicine?  This drug is given as an infusion into a vein. It is administered in a hospital or clinic by a specially trained health care professional.  Talk to your pediatrician regarding the use of this medicine in children. Special care may be needed.  What side effects may I notice from receiving this medicine?  Side effects that you should report to your doctor or health care professional as soon as possible:  allergic reactions like skin rash, itching or hives, swelling of the face, lips, or tongue  low blood counts - this medicine may decrease the number of white blood cells, red blood cells and  platelets. You may be at increased risk for infections and bleeding.  signs of infection - fever or chills, cough, sore throat, pain or difficulty passing urine  signs of decreased platelets or bleeding - bruising, pinpoint red spots on the skin, black, tarry stools, blood in the urine  signs of decreased red blood cells - unusually weak or tired, fainting spells, lightheadedness  breathing problems, like a dry cough  changes in emotions or moods  chest pain  confusion  diarrhea  high blood pressure  mouth or throat sores or ulcers  pain, swelling, warmth in the leg  pain on swallowing  swelling of the ankles, feet, hands  trouble passing urine or change in the amount of urine  vomiting  yellowing of the eyes or skin  Side effects that usually do not require medical attention (report to your doctor or health care professional if they continue or are bothersome):  hair loss  loss of appetite  nausea  stomach upset  What may interact with this medicine?  aspirin and aspirin-like medicines  medicines to increase blood counts like filgrastim, pegfilgrastim, sargramostim  methotrexate  NSAIDS, medicines for pain and inflammation, like ibuprofen or naproxen  probenecid  pyrimethamine  vaccines  Talk to your doctor or health care professional before taking any of these medicines:  acetaminophen  aspirin  ibuprofen  ketoprofen  naproxen  What if I miss a dose?  It is important not to miss your dose. Call your doctor or health care professional if you are unable to keep an appointment.  Where should I keep my medicine?  This drug is given in a hospital or clinic and will not be stored at home.  What should I tell my health care provider before I take this medicine?  They need to know if you have any of these conditions:  if you frequently drink alcohol containing beverages  infection (especially a virus infection such as chickenpox, cold sores, or herpes)  kidney disease  liver disease  low blood counts, like low platelets,  red bloods, or white blood cells  an unusual or allergic reaction to pemetrexed, mannitol, other medicines, foods, dyes, or preservatives  pregnant or trying to get pregnant  breast-feeding  What should I watch for while using this medicine?  Visit your doctor for checks on your progress. This drug may make you feel generally unwell. This is not uncommon, as chemotherapy can affect healthy cells as well as cancer cells. Report any side effects. Continue your course of treatment even though you feel ill unless your doctor tells you to stop.  In some cases, you may be given additional medicines to help with side effects. Follow all directions for their use.  Call your doctor or health care professional for advice if you get a fever, chills or sore throat, or other symptoms of a cold or flu. Do not treat yourself. This drug decreases your body's ability to fight infections. Try to avoid being around people who are sick.  This medicine may increase your risk to bruise or bleed. Call your doctor or health care professional if you notice any unusual bleeding.  Be careful brushing and flossing your teeth or using a toothpick because you may get an infection or bleed more easily. If you have any dental work done, tell your dentist you are receiving this medicine.  Avoid taking products that contain aspirin, acetaminophen, ibuprofen, naproxen, or ketoprofen unless instructed by your doctor. These medicines may hide a fever.  Call your doctor or health care professional if you get diarrhea or mouth sores. Do not treat yourself.  To protect your kidneys, drink water or other fluids as directed while you are taking this medicine.  Men and women must use effective birth control while taking this medicine. You may also need to continue using effective birth control for a time after stopping this medicine. Do not become pregnant while taking this medicine. Tell your doctor right away if you think that you or your partner might be  pregnant. There is a potential for serious side effects to an unborn child. Talk to your health care professional or pharmacist for more information. Do not breast-feed an infant while taking this medicine. This medicine may lower sperm counts.  Date Last Reviewed:   NOTE:This sheet is a summary. It may not cover all possible information. If you have questions about this medicine, talk to your doctor, pharmacist, or health care provider. Copyright© 2016 [employer]              Your Current Medications Are     acetaminophen (TYLENOL) 650 MG TbSR    albuterol 90 mcg/actuation inhaler    atorvastatin (LIPITOR) 20 MG tablet    carvedilol (COREG) 3.125 MG tablet    dexamethasone (DECADRON) 4 MG Tab    dutasteride (AVODART) 0.5 mg capsule    esomeprazole (NEXIUM) 40 MG capsule    folic acid (FOLVITE) 1 MG tablet    guaifenesin (MUCINEX) 600 mg 12 hr tablet    Lactobacillus rhamnosus GG (CULTURELLE) 10 billion cell capsule    nitroGLYCERIN (NITROSTAT) 0.3 MG SL tablet    tamsulosin (FLOMAX) 0.4 mg Cp24      Facility-Administered Medications     dexamethasone (DECADRON) 10 mg in sodium chloride 0.9% IVPB    heparin, porcine (PF) 100 unit/mL injection flush 500 Units    pemetrexed (ALIMTA) 850 mg in sodium chloride 0.9% 100 mL chemo infusion    sodium chloride 0.9% 100 mL flush bag      Appointments for Next Year     3/3/2017 10:40 AM ESTABLISHED PATIENT (20 min.) [facility] - Internal Medicine Siddharth Kaba MD    Arrive at check-in approximately 15 minutes before your scheduled appointment time. Bring all outside medical records and imaging, along with a list of your current medications and insurance card.    (off [facility]) 1st floor    3/10/2017  8:30 AM NON FASTING LAB (5 min.) [facility]    Arrive at check-in approximately 15 minutes before your scheduled appointment time. Bring all outside medical records and imaging, along with a list of your current medications and  "insurance card.    (off [facility]) 2nd floor    3/10/2017  9:00 AM ESTABLISHED PATIENT (20 min.) [facility] - Hemotology Oncology Ankita Edge MD    Arrive at check-in approximately 15 minutes before your scheduled appointment time. Bring all outside medical records and imaging, along with a list of your current medications and insurance card.    (off [facility]) 3rd Floor    4/5/2017  8:20 AM FASTING LAB (5 min.) [facility], [facility]    1. Do not eat or drink anything for TEN HOURS (10) PRIOR TO TEST. Do not chew gum or eat candy mints, even those claiming to be sugar free. Water is allowed but do not drink any other fluids 2. Take your regular daily medicines as your doctor has ordered. If you are diabetic, do not take your insulin or other diabetic medication until your blood is drawn and you are ready to eat. Your physician may have special instructions for diabetics. Check with your doctor if you have any questions.3. Alcoholic beverages are not allowed starting at 6:00pm the evening before your appointment.    (off [facility]) 2nd floor    4/24/2017 10:20 AM ESTABLISHED PATIENT (20 min.) [facility] - Pulmonary Services Ace Kelly MD    Arrive at check-in approximately 15 minutes before your scheduled appointment time. Bring all outside medical records and imaging, along with a list of your current medications and insurance card.    (off [facility]) 2nd floor         Default Flowsheet Data (last 24 hours)      Amb Complex Vitals Ralph        02/27/17 1124 02/27/17 1040             Measurements    Weight  57.2 kg (126 lb)       Height  6' 1" (1.854 m)       BSA (Calculated - sq m)  1.72 sq meters       BMI (Calculated)  16.7       BP  132/78       Temp  97 °F (36.1 °C)       Pulse  93       Resp  18       SpO2  97 %       Pain Assessment    Pain Score Zero Zero               Allergies     Codeine Swelling    Novocain  [Procaine]     Other reaction(s): Dizziness    "   Medications You Received from 02/26/2017 1204 to 02/27/2017 1204        Date/Time Order Dose Route Action     02/27/2017 1135 dexamethasone (DECADRON) 10 mg in sodium chloride 0.9% IVPB   Intravenous New Bag     02/27/2017 1158 pemetrexed (ALIMTA) 850 mg in sodium chloride 0.9% 100 mL chemo infusion 850 mg Intravenous New Bag      Current Discharge Medication List     Cannot display discharge medications since this is not an admission.

## 2017-02-27 NOTE — PLAN OF CARE
Problem: Patient Care Overview (Adult)  Goal: Plan of Care Review  Outcome: Ongoing (interventions implemented as appropriate)  Oh i feel pretty good

## 2017-02-28 DIAGNOSIS — K21.9 GASTROESOPHAGEAL REFLUX DISEASE, ESOPHAGITIS PRESENCE NOT SPECIFIED: ICD-10-CM

## 2017-02-28 DIAGNOSIS — J18.9 CAP (COMMUNITY ACQUIRED PNEUMONIA): ICD-10-CM

## 2017-02-28 RX ORDER — ESOMEPRAZOLE MAGNESIUM 40 MG/1
CAPSULE, DELAYED RELEASE ORAL
Qty: 30 CAPSULE | Refills: 0 | Status: SHIPPED | OUTPATIENT
Start: 2017-02-28

## 2017-02-28 NOTE — TELEPHONE ENCOUNTER
----- Message from Cora Orozco sent at 2/28/2017 10:59 AM CST -----  Contact: jesus from [facility]   States pt is out of refills on his coreg 3.125 mg twice daily and states the Dr increased it to 4 tabs daily and has run out and needs to have a refill on it [phone]//thanks/dbw     Pt pharm is   suma lugo [facility]

## 2017-02-28 NOTE — TELEPHONE ENCOUNTER
Spoke with Zoey at [facility], she states that pt has been taking his Coreg 3.125 mg 2 tablets BID and is out of medication. Informed Zoey that his cardiologist refilled prescription on 2/22/17 but directions state one tablet twice daily. She states that pt told her that Dr. Payton recommended pt increase to two tablets. Notified Zoey that a message would be sent to Dr. Payton. She verbalized understanding.

## 2017-03-01 RX ORDER — CARVEDILOL 6.25 MG/1
6.25 TABLET ORAL 2 TIMES DAILY WITH MEALS
Qty: 60 TABLET | Refills: 0 | Status: SHIPPED | OUTPATIENT
Start: 2017-03-01 | End: 2018-03-01

## 2017-03-01 RX ORDER — ALBUTEROL SULFATE 90 UG/1
2 AEROSOL, METERED RESPIRATORY (INHALATION) EVERY 6 HOURS PRN
Qty: 1 INHALER | Refills: 1 | Status: SHIPPED | OUTPATIENT
Start: 2017-03-01 | End: 2018-03-01

## 2017-03-01 RX ORDER — CARVEDILOL 3.12 MG/1
3.12 TABLET ORAL 2 TIMES DAILY
Qty: 180 TABLET | Refills: 0 | Status: CANCELLED | OUTPATIENT
Start: 2017-03-01

## 2017-03-01 NOTE — TELEPHONE ENCOUNTER
----- Message from Mirella Garcia sent at 3/1/2017 11:46 AM CST -----  cindy Barriga, #[phone] is requesting to speak with the nurse concerning the pt's Beta blocker and inhaler,

## 2017-03-01 NOTE — TELEPHONE ENCOUNTER
Patient's niece reported that patient has been taking carvedilol 3.125 mg 2 tablets twice daily, as recommended by PCP Dr. Payton, after having EKG done in January, and  documentation has been done about the change of medication on 1/13/17 by PCP.   Patient is out  of carvedilol and requesting refill.  Will be changed to 6.25 mg twice daily, new prescription has been sent to pharmacy for this month, patient has appointment with Dr. Payton this Friday, advised to discuss further.

## 2017-03-01 NOTE — TELEPHONE ENCOUNTER
----- Message from Mirella Garcia sent at 3/1/2017 11:46 AM CST -----  cindy Barriga, #[phone] is requesting to speak with the nurse concerning the pt's Beta blocker and inhaler,

## 2017-03-02 RX ORDER — CARVEDILOL 6.25 MG/1
6.25 TABLET ORAL 2 TIMES DAILY WITH MEALS
Qty: 60 TABLET | Refills: 0 | Status: CANCELLED | OUTPATIENT
Start: 2017-03-02 | End: 2018-03-02

## 2017-03-02 NOTE — TELEPHONE ENCOUNTER
Spoke with pt letting him know I am send the refill request to Dr Payton. Pt states that he is out and needs it today.Last visit 2/22/17

## 2017-03-02 NOTE — TELEPHONE ENCOUNTER
----- Message from Kostas Robison sent at 3/2/2017  9:16 AM CST -----  Contact: pt  Pt is calling Nurse staff regarding a refill RX carvedilol 3.125 mg tabs. Pt call back [phone] thanks    [facility] [email or web address] [address] [place] [address] PHILLIP BOYCE [facility] JEANNIE LEZAMA AT [address] [place] [address]  Phone: [phone] Fax: [phone]

## 2017-03-07 ENCOUNTER — TELEPHONE (OUTPATIENT)
Dept: HEMATOLOGY/ONCOLOGY | Facility: CLINIC | Age: 82
End: 2017-03-07

## 2017-03-07 DIAGNOSIS — R11.0 CHEMOTHERAPY-INDUCED NAUSEA: Primary | ICD-10-CM

## 2017-03-07 DIAGNOSIS — T45.1X5A CHEMOTHERAPY-INDUCED NAUSEA: Primary | ICD-10-CM

## 2017-03-07 RX ORDER — ONDANSETRON 8 MG/1
8 TABLET, ORALLY DISINTEGRATING ORAL EVERY 8 HOURS PRN
Qty: 30 TABLET | Refills: 1 | Status: SHIPPED | OUTPATIENT
Start: 2017-03-07 | End: 2018-03-07

## 2017-03-07 NOTE — TELEPHONE ENCOUNTER
Home health nurse called stating patient has developed 2 pressure ulcers on his bottom. Also, patient states he is weak and nauseous. Dr murillo gave orders for wound care on patient.

## 2017-03-09 DIAGNOSIS — N40.1 BPH WITH URINARY OBSTRUCTION: ICD-10-CM

## 2017-03-09 DIAGNOSIS — N13.8 BPH WITH URINARY OBSTRUCTION: ICD-10-CM

## 2017-03-09 RX ORDER — DUTASTERIDE 0.5 MG/1
CAPSULE, LIQUID FILLED ORAL
Qty: 90 CAPSULE | Refills: 1 | Status: SHIPPED | OUTPATIENT
Start: 2017-03-09

## 2017-03-10 ENCOUNTER — OFFICE VISIT (OUTPATIENT)
Dept: HEMATOLOGY/ONCOLOGY | Facility: CLINIC | Age: 82
End: 2017-03-10
Payer: MEDICARE

## 2017-03-10 ENCOUNTER — LAB VISIT (OUTPATIENT)
Dept: LAB | Facility: HOSPITAL | Age: 82
End: 2017-03-10
Attending: INTERNAL MEDICINE
Payer: MEDICARE

## 2017-03-10 VITALS
SYSTOLIC BLOOD PRESSURE: 112 MMHG | WEIGHT: 119 LBS | TEMPERATURE: 98 F | DIASTOLIC BLOOD PRESSURE: 80 MMHG | HEIGHT: 72 IN | BODY MASS INDEX: 16.12 KG/M2 | RESPIRATION RATE: 16 BRPM | OXYGEN SATURATION: 80 % | HEART RATE: 65 BPM

## 2017-03-10 DIAGNOSIS — C64.2 TRANSITIONAL CELL CARCINOMA OF LEFT KIDNEY: ICD-10-CM

## 2017-03-10 DIAGNOSIS — D69.59 CHEMOTHERAPY-INDUCED THROMBOCYTOPENIA: ICD-10-CM

## 2017-03-10 DIAGNOSIS — T45.1X5A LEUKOPENIA DUE TO ANTINEOPLASTIC CHEMOTHERAPY: ICD-10-CM

## 2017-03-10 DIAGNOSIS — R05.9 COUGH: Primary | ICD-10-CM

## 2017-03-10 DIAGNOSIS — Z71.89 ENCOUNTER FOR HOSPICE CARE DISCUSSION: ICD-10-CM

## 2017-03-10 DIAGNOSIS — T45.1X5A CHEMOTHERAPY-INDUCED THROMBOCYTOPENIA: ICD-10-CM

## 2017-03-10 DIAGNOSIS — D70.1 LEUKOPENIA DUE TO ANTINEOPLASTIC CHEMOTHERAPY: ICD-10-CM

## 2017-03-10 LAB
ALBUMIN SERPL BCP-MCNC: 2.3 G/DL
ALP SERPL-CCNC: 112 U/L
ALT SERPL W/O P-5'-P-CCNC: 15 U/L
ANION GAP SERPL CALC-SCNC: 11 MMOL/L
AST SERPL-CCNC: 25 U/L
BASOPHILS # BLD AUTO: 0 K/UL
BASOPHILS NFR BLD: 0 %
BILIRUB SERPL-MCNC: 0.6 MG/DL
BUN SERPL-MCNC: 41 MG/DL
CALCIUM SERPL-MCNC: 8.8 MG/DL
CHLORIDE SERPL-SCNC: 105 MMOL/L
CO2 SERPL-SCNC: 21 MMOL/L
CREAT SERPL-MCNC: 1.4 MG/DL
DIFFERENTIAL METHOD: ABNORMAL
EOSINOPHIL # BLD AUTO: 0 K/UL
EOSINOPHIL NFR BLD: 1.4 %
ERYTHROCYTE [DISTWIDTH] IN BLOOD BY AUTOMATED COUNT: 16.8 %
EST. GFR  (AFRICAN AMERICAN): 51 ML/MIN/1.73 M^2
EST. GFR  (NON AFRICAN AMERICAN): 44 ML/MIN/1.73 M^2
GLUCOSE SERPL-MCNC: 114 MG/DL
HCT VFR BLD AUTO: 35.1 %
HGB BLD-MCNC: 11.1 G/DL
LYMPHOCYTES # BLD AUTO: 0.6 K/UL
LYMPHOCYTES NFR BLD: 19.7 %
MCH RBC QN AUTO: 27.9 PG
MCHC RBC AUTO-ENTMCNC: 31.6 %
MCV RBC AUTO: 88 FL
MONOCYTES # BLD AUTO: 0.3 K/UL
MONOCYTES NFR BLD: 10.4 %
NEUTROPHILS # BLD AUTO: 1.9 K/UL
NEUTROPHILS NFR BLD: 68.5 %
PLATELET # BLD AUTO: 79 K/UL
PMV BLD AUTO: 10.4 FL
POTASSIUM SERPL-SCNC: 4.3 MMOL/L
PROT SERPL-MCNC: 6.9 G/DL
RBC # BLD AUTO: 3.98 M/UL
SODIUM SERPL-SCNC: 137 MMOL/L
WBC # BLD AUTO: 2.79 K/UL

## 2017-03-10 PROCEDURE — 1160F RVW MEDS BY RX/DR IN RCRD: CPT | Mod: S$GLB,,, | Performed by: INTERNAL MEDICINE

## 2017-03-10 PROCEDURE — 85025 COMPLETE CBC W/AUTO DIFF WBC: CPT | Mod: PO

## 2017-03-10 PROCEDURE — 99214 OFFICE O/P EST MOD 30 MIN: CPT | Mod: S$GLB,,, | Performed by: INTERNAL MEDICINE

## 2017-03-10 PROCEDURE — 36415 COLL VENOUS BLD VENIPUNCTURE: CPT | Mod: PO

## 2017-03-10 PROCEDURE — 1126F AMNT PAIN NOTED NONE PRSNT: CPT | Mod: S$GLB,,, | Performed by: INTERNAL MEDICINE

## 2017-03-10 PROCEDURE — 1159F MED LIST DOCD IN RCRD: CPT | Mod: S$GLB,,, | Performed by: INTERNAL MEDICINE

## 2017-03-10 PROCEDURE — 99999 PR PBB SHADOW E&M-EST. PATIENT-LVL IV: CPT | Mod: PBBFAC,,, | Performed by: INTERNAL MEDICINE

## 2017-03-10 PROCEDURE — 80053 COMPREHEN METABOLIC PANEL: CPT | Mod: PO

## 2017-03-10 PROCEDURE — 1157F ADVNC CARE PLAN IN RCRD: CPT | Mod: S$GLB,,, | Performed by: INTERNAL MEDICINE

## 2017-03-10 PROCEDURE — 99499 UNLISTED E&M SERVICE: CPT | Mod: S$GLB,,, | Performed by: INTERNAL MEDICINE

## 2017-03-10 RX ORDER — BENZONATATE 100 MG/1
100 CAPSULE ORAL 3 TIMES DAILY PRN
Qty: 30 CAPSULE | Refills: 1 | Status: SHIPPED | OUTPATIENT
Start: 2017-03-10 | End: 2018-03-10

## 2017-03-10 NOTE — PROGRESS NOTES
Hematology/Oncology Established Visit    Reason for Consult: Management of renal mass    Consult requested by: Dr. Payton, Primary care    [facility] Diagnosis: Transitional cell carcinoma    Stage: IV    Pathology:   Left kidney biopsy 12/2015 in [facility].  High grade (poorly differentiated) carcinoma, favor urothelial phenotype.  Note: Dr. Hollie Acevedo in Pathology stated that urothelial type favored rather than RCC given positivity for MURRAY-3 and p63 and negativity for PAX-8.    Prior Treatment:   1. Weekly Carboplatin-Gemcitabine, starting 3/17/16 (given on days 1, 8, 15 of 28-day cycle). Discontinued 9/7/16 due to disease progression.  2. Atezolizumab 1200mg IV every 3 weeks, approx 9/2016 - 1/2017, discontinued due to disease progression.  3. Pemetrexed 500mg/m2 every 3 weeks, started 2/2017. Only 1 dose given due to severe fatigue    Current Treatment: Hospice    History of Present Illness:  Mr. Lake is an 87 y/o male with HTN, CAD, h/o MALT lymphoma s/p radiation who comes in for evaluation of metastatic renal cell carcinoma. He was initially evaluated by Dr. Bonds in [facility] at the [place]. Given drenching night sweats and weight loss, patient underwent PET scan, which was notable for a large hypermetabolic left renal mass as well as hypermetabolic retroperitoneal LNs and adrenal gland on right. Potential treatment with nephrectomy followed by systemic therapy was discussed with the patient as well as hospice. Patient opted to think about this for a while and obtain a 2nd opinion. He saw. Dr. Aiden Hendrickson in [place] for the 2nd opinion. He underwent a  CT-guided biopsy of the kidney mass, was told it was positive for renal cell carcinoma and not transitional cell carcinoma. He was told he could start on potential oral agents for treatment. The patient and his wife were in the process of moving into an assisted living facility. Then they both were admitted for pneumonia. The patient was  discharged from the hospital in early February, has been admitted to [facility] living [facility], and now comes in to discuss potentially starting some sort of treatment. 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 He did all the grocery shopping and cooking. He was up on his feet more than 50% of the day. He has slowed down after the recent PNA. However, he has a good appetite and states he is gaining strength. He does endorse fatigue, BROWNING, but no CP. Outside records reviewed: Urine cytology negative. Cystoscopy with biopsy negative per patient. CT-guided biopsy consistent with transitional cell carcinoma.    Patient comes in for follow up of transitional cell carcinoma. Unfortunately, his most recent imaging demonstrated disease progression. As treatment with Opdivo did not get approved by his insurance company despite multiple [id number] review discussions, I started patient on Pemetrexed. Today, he comes in for agustín counts check. Unfortunately, he has had a rough time with this. He has been extremely fatigued and unable to get move around as much. It is difficult for him to get out of bed. He has no appetite and becomes SOB with any activity. No n/v/d/fevers, chills. His home health nurses have been a great help to him. His chronic medical problems include HTN, CAD, managed by primary care.    ROS:  General:  No wt loss, fever/chills, night sweats +fatigue  Eyes: No vision problems, pain or inflammation.   Ears/Nose: No difficultly hearing, ear pain, rhinorrhea, or epistaxis  Oropharynx: No ulcers, dysphagia, or odynophagia  Cardiovascular: No chest pains, sob, PND. +dyspnea on exertion  Pulmonary: No cough, sob, hemoptysis  Gastrointestional: No n/v/d, melena, hematochezia, or change in bowel habits +poor appetite  : No dysuria, hematuria, pelvic pain or flank pain  Musculoskeletal: No myalgias, weakness, or  arthralgias  Neurological: No headaches, focal deficits, or seizure activity  Endocrine: No heat or cold intolerance   Skin: No rashes or lesions   Psychiatric: No symptoms of mood disorders  Heme/Lymph: No lymph node enlargment    Past Medical History:   Diagnosis Date    Anemia     AR (allergic rhinitis)     Arthritis     Atrial fibrillation     in 2010 after pneumonia    BPH (benign prostatic hyperplasia)     BPH with urinary obstruction     CAD (coronary artery disease)     Cancer     MALT lymphoma//radiation    Cancer of lymphatic and hematopoietic tissue 2/13/2014    Cataract     Depression     Gastroesophageal reflux disease     Hearing loss     Hematoma complicating a procedure 11/5/2013    Hypertension     Malignant neoplasm metastatic to lymph nodes of multiple sites 10/8/2015    Obstructive sleep apnea     uses C-PAP    Open wound of shoulder region without complication 11/5/2013    PAC (premature atrial contraction) 4/16/2015 4/2015 event monitor radha PAC short runs atrial tach.     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 Former smoker, quit in 1970 after 30 pk-yrs. 10 glasses of wine/week or 1-2 glasses per day. Retired from prior work in engineering. Lives in an assisted living facility now.    Family History: family history includes Cancer in his sister; Heart disease in his brother and mother; Stroke in his brother. There is no history of Amblyopia, Blindness, Diabetes, Glaucoma, Macular degeneration, Retinal detachment, Strabismus, Thyroid disease, Hypertension, or Cataracts. Sister had DLBCL.    Physical Exam:  Vitals:    03/10/17 1355   BP: 112/80   Pulse: 65   Resp: 16   Temp: 97.5 °F (36.4 °C)     Body mass  index is 16.14 kg/(m^2).   ECOG PS: 2  General:  AAOx4, pleasant thin elderly male in no acute distress sitting in wheelchair, accompanied by his niece.   HEENT: EOMI. Normocephalic and atraumatic. No maxillary sinus tenderness. External auditory canals clear and TMs intact without lesions. Nasal and oral mucosal membranes moist. Normal dentition and gums.   Neck: no LAD, thyromegaly, normal ROM  Pulmonary: Fine crackles in bilateral lower lung fields, otherwise clear to auscultation, Normal effort with no accessory muscle use, no wheezes/rales/rhonchi  CV: Normal rate, regular rhythm, no murmurs/rubs/gallops, no edema  ABD:  Soft, nontender, nondistended, no mass, and without hepatosplenomegaly   Ext: No clubbing, cyanosis, normal ROM. +trace pitting edema in BLE  Skin: No rashes, lesions, bruising or petechiae  Neurological: No focal deficits, CN II to XII grossly intact, normal coordination    Psychiatric:  Normal mood, affect and judgement  Hem/Lymph:  No submandibular, cervical, supraclavicular, axillary LAD.    Labs:    Lab Results   Component Value Date    WBC 2.79 (L) 03/10/2017    HGB 11.1 (L) 03/10/2017    HCT 35.1 (L) 03/10/2017    MCV 88 03/10/2017    PLT 79 (L) 03/10/2017     Lab Results   Component Value Date     03/10/2017    K 4.3 03/10/2017     03/10/2017    CO2 21 (L) 03/10/2017    BUN 41 (H) 03/10/2017    CREATININE 1.4 03/10/2017    CALCIUM 8.8 03/10/2017    ANIONGAP 11 03/10/2017    ESTGFRAFRICA 51 (A) 03/10/2017    EGFRNONAA 44 (A) 03/10/2017     Lab Results   Component Value Date    ALT 15 03/10/2017    AST 25 03/10/2017    ALKPHOS 112 03/10/2017    BILITOT 0.6 03/10/2017       Lab Results   Component Value Date    IRON 16 (L) 02/25/2016    TIBC 271 02/25/2016    FERRITIN 1435 (H) 04/21/2016     Lab Results   Component Value Date    AZEXSDPD81 594 07/14/2016     Lab Results   Component Value Date    FOLATE 15.6 07/14/2016     Lab Results   Component Value Date    TSH 0.431  12/13/2016       Imaging:  PET/CT 10/6/2015:   Large hypermetabolic left renal mass highly suspicious for renal cell carcinoma. There also findings suggestive of metastatic disease to mediastinal lymph nodes, a right para-aortic retroperitoneal lymph node, and the right adrenal gland.    CXR 2/16/16:  Interval improved inspiratory result. Overall appearance appears to have returned to baseline. Chronic interstitial disease with focal areas of fibrosis again noted, more pronounced on the RIGHT. No dense consolidation or definite effusion. Osteopenia and spondylosis. Narrowing at the cardia mediastinal contour and normal appearance of the hilar soft tissues. Trachea is midline.     Chest CT and urogram 3/9/16:  1. Diffusely abnormal appearance of the left kidney which measures approximately 13.6 x 9 cm in diameter and appears nonfunctioning. Most of the kidney appears to be occupied by a neoplastic process.  2. Complex cyst upper pole right kidney containing mural calcification.  3. Arteriosclerotic disease.  4. Old granulomatous disease.  5. Interstitial disease in both lungs with calcified pleural plaques favoring asbestos exposure.  6. Groundglass nodules in the right lung measuring up to 8.2 mm. Per Fleischer Society guidelines for nodule >8mm; in a low or high risk patient, recommend 3, 9, and 24 month CT chest follow-up to exclude neoplasia. PET scan and/or biopsy may also be considered. Metastatic disease not excluded.  7. Retroperitoneal adenopathy.   8. Enlargement of the right adrenal gland. Metastatic disease not excluded.    Chest/abd/pelvis CT 5/5/16:  1.  Left renal mass appears overall stable in size.  2.  Stable appearance to mildly enlarged right adrenal gland.  3.  Interval decrease in size of index right periaortic node.  4.  Other chronic findings as discussed above appear overall stable.    Chest/abd/pelvis CT 97/16:  Detrimental interval change with regard to enlarging right hilar and  retroperitoneal lymphadenopathy.  Little interval change in large left renal mass.    Chest/abd/pelvis CT 11/9/16:  1.  Slight interval worsening with enlarging confluent right hilar adenopathy and minimal increase in size of primary left renal lesion.  2.  Unchanged retroperitoneal adenopathy..  3.  Other stable chronic findings as above.    Chest/abd/pelvis CT 1/19/17:  1.  Continued detrimental interval change with increase in size of large primary left renal mass and conglomerate right hilar lymph node mass.  2.  Newly developed right greater left pleural effusions and new infiltrate right upper lobe.  Clinically exclude pneumonia.  3.  Remainder as above.    Assessment / Plan:  Leeroy Lake is a 89 y.o. male with HTN, CAD, prior h/o MALT lymphoma comes in for evaluation of metastatic renal cell carcinoma.    1. Metastatic transitional cell carcinoma: Although history provided by patient was suggestive of metastatic renal cell carcinoma, review of pathology report and discussion with pathologist who read the biopsy is more consistent with transitional cell (urothelial carcinoma), likely arising from the left renal pelvis. Given metastatic lympadenopathy on PET, specifically the left periaortic LN of 2.5 x 2.2cm with SUV 16.9, feel this is clearly metastatic disease. There is no indication for nephrectomy in the setting of metastatic transitional cell carcinoma. Pt was treated with weekly Carboplatin + Gemcitabine. Then, due to disease progression, he was switched to Atezolizumab.   -- Had a long discussion with the patient that based on his cytopenias, he would need a 10-20% dose reduction of the chemotherapy. This would make the chemotherapy less effective and 3rd line chemotherapy already has only a 10-30% chance of response. As the chemotherapy is making him feel so tired and miserable, it is not worth continuing. Also, his tumor has been shown to be somewhat slow growing. At this point, I feel he will  do better and feel better off chemotherapy than with continued treatment.  -- Discontinue Pemetrexed and refer for home hospice.     2. Anemia of chronic disease (AOCD): Iron profile c/w of AOCD. Fluctuating and likely due to anemia of chronic disease, cancer and chemotherapy. S/p Injectafer x 2. VitB12, folate normal.    3. Chemotherapy induced thrombocytopenia: Due to Pemetrexed and would require a 10-20% dose reduction.    4. Pulmonary fibrosis: Stable. Seen on recent CXR. Unclear etiology and may be related to prior work exposure or may be idiopathic. Uses Albuterol inhaler prn.    5. Weight loss/low appetite: Likely due to underlying malignancy. Have tried Prednisone in the past with some improvement.    Ankita Edge M.D.  Hematology Oncology    Spent over 45 minutes face to face with patient discussing transitioning to hospice and comfort care.

## 2017-03-13 ENCOUNTER — TELEPHONE (OUTPATIENT)
Dept: HEMATOLOGY/ONCOLOGY | Facility: CLINIC | Age: 82
End: 2017-03-13

## 2017-03-13 NOTE — TELEPHONE ENCOUNTER
Appointment desk documented wrong number, attempted many different number similar to the number given. All numbers were incorrect, no Bill available.

## 2017-03-13 NOTE — TELEPHONE ENCOUNTER
----- Message from Kris Hanson sent at 3/13/2017 10:42 AM CDT -----  Contact: KYLEE gill  She's calling in regards to the hospice care the pt will be going under, please advise, [phone] (cell)

## 2017-03-16 ENCOUNTER — TELEPHONE (OUTPATIENT)
Dept: HEMATOLOGY/ONCOLOGY | Facility: CLINIC | Age: 82
End: 2017-03-16

## 2017-03-16 NOTE — TELEPHONE ENCOUNTER
Ptient is with [facility]. Spoke with patients hospice nurse, Willie Desai. Patient has no change in status, patient is in outpatient hospice. Nurse's number is [phone]. Advised him to please call with any change in status. Willie verbalized understanding.

## 2017-03-23 ENCOUNTER — TELEPHONE (OUTPATIENT)
Dept: HEMATOLOGY/ONCOLOGY | Facility: CLINIC | Age: 82
End: 2017-03-23

## 2017-03-23 NOTE — TELEPHONE ENCOUNTER
Spoke with patient's hospice nurse. Patient recently had a fall at home, skinned his back up some. Patient is more less in a failure to thrive mode. Appetite has improved some. He is currently being treated for a UTI by the hospice company.

## 2017-03-27 DIAGNOSIS — K21.9 GASTROESOPHAGEAL REFLUX DISEASE, ESOPHAGITIS PRESENCE NOT SPECIFIED: ICD-10-CM

## 2017-03-27 RX ORDER — ESOMEPRAZOLE MAGNESIUM 40 MG/1
CAPSULE, DELAYED RELEASE ORAL
Qty: 30 CAPSULE | Refills: 0 | OUTPATIENT
Start: 2017-03-27

## 2018-12-06 NOTE — TELEPHONE ENCOUNTER
----- Message from Kris Hanson sent at 2/14/2017  9:25 AM CST -----  Contact: pt  He's calling in regards to a missed call, please advise, [phone] (home)     Telephone Encounter by Taryn Erwin NCMA at 10/23/17 03:38 PM     Author:  Taryn Erwin NCMA Service:  (none) Author Type:  Certified Medical Assistant     Filed:  10/23/17 03:38 PM Encounter Date:  10/23/2017 Status:  Signed     :  Taryn Erwni NCMA (Certified Medical Assistant)            This was sent to the pharmacy 08/08/17 #30 6 refills. Requested to soon.[AH1.1M]       Revision History        User Key Date/Time User Provider Type Action    > AH1.1 10/23/17 03:38 PM Taryn Erwin NCMA Certified Medical Assistant Sign    M - Manual
